# Patient Record
(demographics unavailable — no encounter records)

---

## 2018-05-21 NOTE — RADIOLOGY REPORT (SQ)
EXAM DESCRIPTION:  MRI ABDOMEN COMBO



COMPLETED DATE/TIME:  5/21/2018 11:10 am



REASON FOR STUDY:  CIRRHOSIS, NON ALCOHOL/ABNORMAL TUMOR K74.69  OTHER CIRRHOSIS OF LIVER R97.8  OTHE
R ABNORMAL TUMOR MARKERS



COMPARISON:  MRI abdomen 6/16/2017

Abdominal ultrasound 5/31/2017

CT abdomen pelvis 5/30/2017



TECHNIQUE:  Multiplanar multisequence imaging performed without and with contrast including sagittal,
 axial and coronal T2, axial T1, axial gradient fat sat T1, axial, sagittal and coronal fat sat T1 po
st contrast.



CONTRAST TYPE AND DOSE:  20 mL Prohance.



RENAL FUNCTION:  GFR > 60.



LIMITATIONS:  None.



FINDINGS:  LIVER: Normal size. No masses.  No dilated ducts. CBD normal.  Normal contrast enhancement
 of the main portal vein, right and left portal vein, hepatic veins.

SPLEEN: 14 cm in length.  No focal lesions

PANCREAS: No masses. No adjacent inflammation or peripancreatic fluid collections. Pancreatic duct no
t dilated.

GALLBLADDER: No masses. No stones. No gallbladder wall thickening or pericholecystic fluid.

ADRENAL GLANDS: No significant masses or asymmetry.

RIGHT KIDNEY AND URETER: No masses. No hydronephrosis.

LEFT KIDNEY AND URETER: No masses.  There is left-sided caliectasis and mildly enlarged left renal pe
lvis, stable compared to previous exams.  Question congenital UPJ obstruction.

AORTA AND VESSELS: No aneurysm. No dissection. Renal arteries, SMA, celiac without stenosis.

RETROPERITONEUM: No retroperitoneal adenopathy, hemorrhage or masses.

BOWEL: Not well seen

ABDOMINAL WALL AND PERITONEUM: No adenopathy or abdominal aorta aneurysm

BONES: No acute or significant findings.

OTHER: There is a umbilical hernia containing nonobstructed small bowel loops.



IMPRESSION:  There is splenomegaly without ascites

No focal liver lesions



TECHNICAL DOCUMENTATION:  JOB ID: [id number]

 [employer]- All Rights Reserved



Reading location - IP/workstation name: [email or web address]

## 2018-11-12 NOTE — RADIOLOGY REPORT (SQ)
EXAM DESCRIPTION:  U/S RETROPERITON (RENAL/AORTA)



COMPLETED DATE/TIME:  11/12/2018 10:02 am



REASON FOR STUDY:  UNSPEC HYDRONEPHROSIS (N13.30) N13.30  UNSPECIFIED HYDRONEPHROSIS



COMPARISON:  MRI abdomen dated 5/21/2018 and 6/16/2017



TECHNIQUE:  Dynamic and static grayscale images acquired of the kidneys and bladder and recorded on P
ACS. Additional selected color Doppler and spectral images recorded.



LIMITATIONS:  None.



FINDINGS:  RIGHT KIDNEY:  The right kidney measures 9.4 cm in length.  Normal echogenicity. No solid 
or suspicious masses. No hydronephrosis. No calcifications.

LEFT KIDNEY:  The left kidney measures 13.0 cm in length.  Normal echogenicity.  Mild prominence of t
he renal.  Mid.  No evidence for significant hydronephrosis.  No calcifications

BLADDER: No masses.  Bilateral ureteral jets are not visualized.

OTHER FINDINGS:  The spleen measures 15.6 cm in length, prominent in size.



IMPRESSION:  1.  The right kidney is slightly smaller in size than the left.  No significant hydronep
hrosis involving either kidney.

2.  Incidental finding splenomegaly.



TECHNICAL DOCUMENTATION:  JOB ID:  [id number]

 2011 [employer]- All Rights Reserved



Reading location - IP/workstation name: [email or web address]

## 2018-12-04 NOTE — ER DOCUMENT REPORT
ED General





- General


Chief Complaint: Dizziness


Stated Complaint: BLOOD PRESSURE PROBLEM


Time Seen by Provider: 12/04/18 16:36


Notes: 





This is a 66-year-old male who had a near syncopal episode while at [facility].  

EMS was called.  Patient never lost consciousness.  Denied any chest pain or 

shortness of breath.  States he just felt a little bit dizzy.  Patient has 

multiple medical problems.  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 

remission.  Followed by gastroenterology, internal medicine as well as 

hematology.  Does not have any malignancy.  Denies any chest pain or shortness 

of breath.  Patient does state that he takes stool softeners and he had a 

couple of fiber bars today and seems to have some loose stool and may have lost 

his bowels.


TRAVEL OUTSIDE OF THE [place] IN LAST 30 DAYS: No





- HPI


Onset: Just prior to arrival


Onset/Duration: Gradual





- Related Data


Allergies/Adverse Reactions: 


 





No Known Allergies Allergy (Verified 12/04/18 15:25)


 











Past Medical History





- General


Information source: Patient





- Social History


Smoking Status: Never Smoker


Chew tobacco use (# tins/day): No


Frequency of alcohol use: None


Drug Abuse: None


Lives with: Alone


Family History: Malignancy - Father with colon cancer in his 50s.


Patient has suicidal ideation: No


Patient has homicidal ideation: No





- Past Medical History


Cardiac Medical History: Reports: Hx Hypertension


   Denies: Hx Coronary Artery Disease, Hx Heart Attack


Pulmonary Medical History: 


   Denies: Hx Asthma, Hx Bronchitis, Hx COPD, Hx Pneumonia


Neurological Medical History: Denies: Hx Cerebrovascular Accident, Hx Seizures


Renal/ Medical History: Denies: Hx Peritoneal Dialysis


GI Medical History: Reports: Hx Gastroesophageal Reflux Disease


Musculoskeletal Medical History: Denies Hx Arthritis


Past Surgical History: Reports: Hx Orthopedic Surgery - L arm, Other - Arm 

orthopedic surgery in the remote past.





- Immunizations


Hx Diphtheria, Pertussis, Tetanus Vaccination: Yes


Hx Pneumococcal Vaccination: 05/01/17





Review of Systems





- Review of Systems


Notes: 





Constitutional: denies: Chills, Diaphoresis, Fever, Malaise, Weakness





EENT: denies: Eye discharge, Blurred vision, Tearing, Double vision, Nose 

congestion, Nose discharge, Throat swelling, Mouth pain





Cardiovascular: denies: Palpitations, Heart racing, Orthopnea, Dyspnea, Chest 

pain





Respiratory: denies: Cough, Hurts to breathe, Wheezing, Shortness of breath





Gastrointestinal: denies: Abdominal pain, Diarrhea, Nausea, Vomiting, Black 

stools, bright red blood in stool





Genitourinary: denies: Burning, Dysuria, Discharge, Frequency, Flank pain, 

Hematuria





Musculoskeletal:  denies: Joint pain, Joint swelling, Muscle pain, Muscle 

stiffness, back pain





Hematologic/Lymphatic:  denies: Anemia, Easy bleeding, Easy bruising, Blood 

clots





Neurological/Psychological: denies: Confusion, Dementia, Depression, Loss of 

consciousness





Skin: No lesions, no masses, no skin breakdown, no abscesses





Physical Exam





- Vital signs


Vitals: 


 











Temp


 


 98.5 F 


 


 12/04/18 15:15











Interpretation: Hypotensive





- General


General appearance: Appears well, Alert





- HEENT


Head: Normocephalic, Atraumatic


Eyes: Normal


Pupils: PERRL





- Respiratory


Respiratory status: No respiratory distress


Chest status: Nontender


Breath sounds: Normal


Chest palpation: Normal





- Cardiovascular


Rhythm: Regular


Heart sounds: Normal auscultation


Murmur: No





- Abdominal


Inspection: Normal


Distension: No distension


Bowel sounds: Normal


Tenderness: Nontender


Organomegaly: No organomegaly





- Rectal


Stool: Heme negative, Black





- Back


Back: Normal, Nontender





- Extremities


General upper extremity: Normal inspection, Nontender, Normal color, Normal ROM

, Normal temperature


General lower extremity: Normal inspection, Nontender, Normal color, Normal ROM

, Normal temperature, Normal weight bearing.  No: Fatemeh's sign





- Neurological


Neuro grossly intact: Yes


Cognition: Normal


Orientation: AAOx4


Doniphan Coma Scale Eye Opening: Spontaneous


Vicente Coma Scale Verbal: Oriented


Vicente Coma Scale Motor: Obeys Commands


Doniphan Coma Scale Total: 15


Speech: Normal


Motor strength normal: LUE, RUE, LLE, RLE


Sensory: Normal





- Psychological


Associated symptoms: Normal affect, Normal mood





- Skin


Skin Temperature: Warm


Skin Moisture: Dry


Skin Color: Normal





Course





- Re-evaluation


Re-evalutation: 





12/04/18 21:22





Laboratory











  12/04/18 12/04/18 12/04/18





  15:35 15:35 15:35


 


WBC  4.2  


 


RBC  3.53 L  


 


Hgb  11.2 L  


 


Hct  31.5 L  


 


MCV  89  


 


MCH  31.7  


 


MCHC  35.5  


 


RDW  13.9  


 


Plt Count  67 L  


 


Seg Neutrophils %  51.6  


 


Lymphocytes %  31.9  


 


Monocytes %  12.7  


 


Eosinophils %  2.8  


 


Basophils %  1.0  


 


Absolute Neutrophils  2.2  


 


Absolute Lymphocytes  1.3  


 


Absolute Monocytes  0.5  


 


Absolute Eosinophils  0.1  


 


Absolute Basophils  0.0  


 


Sodium   141.0 


 


Potassium   4.3 


 


Chloride   107 


 


Carbon Dioxide   24 


 


Anion Gap   10 


 


BUN   17 


 


Creatinine   0.77 


 


Est GFR ( Amer)   > 60 


 


Est GFR (Non-Af Amer)   > 60 


 


Glucose   147 H 


 


Calcium   8.5 


 


Total Bilirubin   0.6 


 


Direct Bilirubin   0.2 


 


Neonat Total Bilirubin   Not Reportable 


 


Neonat Direct Bilirubin   Not Reportable 


 


Neonat Indirect Bili   Not Reportable 


 


AST   29 


 


ALT   21 


 


Alkaline Phosphatase   48 


 


Creatine Kinase   51 L 


 


CK-MB (CK-2)    0.61


 


Troponin I    < 0.012


 


Total Protein   5.3 L 


 


Albumin   2.7 L 


 


Stool Occult Blood   














  12/04/18





  19:44


 


WBC 


 


RBC 


 


Hgb 


 


Hct 


 


MCV 


 


MCH 


 


MCHC 


 


RDW 


 


Plt Count 


 


Seg Neutrophils % 


 


Lymphocytes % 


 


Monocytes % 


 


Eosinophils % 


 


Basophils % 


 


Absolute Neutrophils 


 


Absolute Lymphocytes 


 


Absolute Monocytes 


 


Absolute Eosinophils 


 


Absolute Basophils 


 


Sodium 


 


Potassium 


 


Chloride 


 


Carbon Dioxide 


 


Anion Gap 


 


BUN 


 


Creatinine 


 


Est GFR ( Amer) 


 


Est GFR (Non-Af Amer) 


 


Glucose 


 


Calcium 


 


Total Bilirubin 


 


Direct Bilirubin 


 


Neonat Total Bilirubin 


 


Neonat Direct Bilirubin 


 


Neonat Indirect Bili 


 


AST 


 


ALT 


 


Alkaline Phosphatase 


 


Creatine Kinase 


 


CK-MB (CK-2) 


 


Troponin I 


 


Total Protein 


 


Albumin 


 


Stool Occult Blood  NEGATIVE














 





Chest X-Ray  12/04/18 16:44


IMPRESSION:  NO ACUTE RADIOGRAPHIC FINDING IN THE CHEST.


 








Patient states he feels much better after getting fluids.  Patient did have 

some transient hypotension however has multiple blood pressures now that are 

acceptable.  He is not tachycardic.  Does not feel bad.  Wants to go home.  

Guaiac negative.  Has multiple specialist to follow him.  At this time I feel 

comfortable discharging.  I did offer the patient an admission based on his 

symptoms he would more than likely would warrant a observation admission 

however patient adamantly denies wanting to stay in the hospital and is more 

concerned about his crockpot that is plugged him and he is hungry and wants to 

go home and eat his dinner that has been cooking all day.  DC at this time.





- Vital Signs


Vital signs: 


 











Temp Pulse Resp BP Pulse Ox


 


 98.3 F   67   20   96/69 L  97 


 


 12/04/18 21:36  12/04/18 15:26  12/04/18 21:31  12/04/18 21:32  12/04/18 21:30














- Laboratory


Result Diagrams: 


 12/04/18 15:35





 12/04/18 15:35


Laboratory results interpreted by me: 


 











  12/04/18 12/04/18





  15:35 15:35


 


RBC  3.53 L 


 


Hgb  11.2 L 


 


Hct  31.5 L 


 


Plt Count  67 L 


 


Glucose   147 H


 


Creatine Kinase   51 L


 


Total Protein   5.3 L


 


Albumin   2.7 L














- EKG Interpretation by Me


EKG shows normal: Sinus rhythm, Axis, Intervals, QRS Complexes, ST-T Waves





Discharge





- Discharge


Clinical Impression: 


 Transient hypotension





Condition: Good


Disposition: HOME, SELF-CARE


Instructions:  Dizziness ([facility]), Orthostatic Hypotension ([facility])


Additional Instructions: 


Return immediately for any concerns.  Please drink plenty of fluid.  Hold your 

panel all tonight and tomorrow morning.  If you are feeling better you may 

resume your regular medications tomorrow night.  Follow-up with your regular 

doctor soon as possible.


Referrals: 


NAYELY KULKARNI MD [Primary Care Provider] - Follow up as needed

## 2018-12-04 NOTE — RADIOLOGY REPORT (SQ)
EXAM DESCRIPTION:  CHEST SINGLE VIEW



COMPLETED DATE/TIME:  12/4/2018 4:58 pm



REASON FOR STUDY:  sob



COMPARISON:  None.



EXAM PARAMETERS:  NUMBER OF VIEWS: One view.

TECHNIQUE: Single frontal radiographic view of the chest acquired.

RADIATION DOSE: NA

LIMITATIONS: None.



FINDINGS:  LUNGS AND PLEURA: No opacities, masses or pneumothorax. No pleural effusion.

MEDIASTINUM AND HILAR STRUCTURES: No masses.  Contour normal.

HEART AND VASCULAR STRUCTURES: Heart normal in size.  Normal vasculature.

BONES: There is rib deformity in the left upper hemithorax laterally.

HARDWARE: None in the chest.

OTHER: No other significant finding.



IMPRESSION:  NO ACUTE RADIOGRAPHIC FINDING IN THE CHEST.



TECHNICAL DOCUMENTATION:  JOB ID:  [id number]

 2011 [employer]- All Rights Reserved



Reading location - IP/workstation name: [email or web address]

## 2018-12-12 NOTE — ER DOCUMENT REPORT
ED General





- General


Chief Complaint: GI Bleeding


Stated Complaint: RECTAL BLEEDING


Time Seen by Provider: 12/12/18 18:31


Notes: 





66-year-old male in no acute distress with history of cirrhosis presents to the 

emergency department for diarrhea, pain in his kidneys and "feeling faint".  He 

was seen here last Tuesday for the symptoms of lightheadedness and was 

Hemoccult negative then.  This past Saturday night he had a nosebleed and a 

bowel movement that was concerning for blood.  Since then he has had severe 

dyspnea on exertion, lightheadedness, and "cannot walk 50 feet without stopping

".  He endorses dizziness, diarrhea that is chronic in nature with blood in it, 

and flank pain.  He denies nausea, vomiting, or any other symptoms.  He was at 

his doctor's office last Friday and was told that his hemoglobin was 10.


TRAVEL OUTSIDE OF THE [place] IN LAST 30 DAYS: No





- Related Data


Allergies/Adverse Reactions: 


 





No Known Allergies Allergy (Verified 12/04/18 15:25)


 











Past Medical History





- General


Information source: Patient





- Social History


Smoking Status: Unknown if Ever Smoked


Frequency of alcohol use: quit 2.5 years ago


Drug Abuse: None


Lives with: Alone


Family History: Reviewed & Not Pertinent, Malignancy - Father with colon cancer 

in his 50s.


Patient has suicidal ideation: No


Patient has homicidal ideation: No





- Past Medical History


Cardiac Medical History: Reports: Hx Hypertension


   Denies: Hx Coronary Artery Disease, Hx Heart Attack


Pulmonary Medical History: 


   Denies: Hx Asthma, Hx Bronchitis, Hx COPD, Hx Pneumonia


Neurological Medical History: Denies: Hx Cerebrovascular Accident, Hx Seizures


Renal/ Medical History: Denies: Hx Peritoneal Dialysis


GI Medical History: Reports: Hx Gastroesophageal Reflux Disease


Musculoskeletal Medical History: Denies Hx Arthritis


Past Surgical History: Reports: Hx Orthopedic Surgery - L arm, Other - Arm 

orthopedic surgery in the remote past.





- Immunizations


Hx Diphtheria, Pertussis, Tetanus Vaccination: Yes


Hx Pneumococcal Vaccination: 05/01/17





Review of Systems





- Review of Systems


Constitutional: See HPI


EENT: See HPI


Cardiovascular: See HPI


Respiratory: See HPI


Gastrointestinal: See HPI


Genitourinary: See HPI


Male Genitourinary: No symptoms reported


Musculoskeletal: No symptoms reported


Skin: No symptoms reported


Hematologic/Lymphatic: No symptoms reported


Neurological/Psychological: No symptoms reported





Physical Exam





- Vital signs


Vitals: 


 











BP


 


 111/94 H


 


 12/12/18 18:24














- Notes


Notes: 





Reviewed vital signs and nursing note as charted by RN. 


CONSTITUTIONAL: well-nourished, acting appropriately for age   


HEAD: Normocephalic, atraumatic, no swelling


EYES: PERRL, Conjunctivae clear, no drainage, EOMI, no scleral icterus


ENT: External ears without lesions, External auditory canal is patent, airway 

patent, mucous membranes pink and moist


NECK: Supple, no cervical lymphadenopathy, no masses


CARD: Regular rate and rhythm, no murmurs, no rubs, no gallops, capillary 

refill < 2 seconds, symmetric pulses


RESP:   The lungs are clear to auscultation bilaterally, no wheezing, no rales, 

no rhonchi.  Respiratory rate and effort are normal, normal chest excursion.  

No respiratory distress, no retractions, no stridor, no nasal flaring, no 

accessory muscle use. 


ABD/GI: Normal bowel sounds, non-distended, soft, non-tender, no rebound, no 

guarding, no organomegaly, rectal exam performed, loose stool and gross blood 

visualized, occult blood sample taken


EXT: Normal ROM in all joints, non-tender to palpation, no effusions, no edema 


SKIN: Normal color for age and race, warm, dry, good turgor, no acute lesions 

noted


NEURO: No facial asymmetry, moves all extremities equally, motor and sensory 

function intact





Course





- Re-evaluation


Re-evalutation: 





12/12/18 19:54


Evaluated patient.  Abdomen was soft, no tenderness to palpation.  Reducible 

umbilical hernia noted.  Patient's hemoglobin 5.7.  Small amount of gross blood 

noted in rectum upon performing rectal exam.  Hemoccult sent. 





2 units PRBCs ordered.  Reassessed patient at the bedside after nurse reported 

that map was 55.  Recycled blood pressure and blood pressure was normal.  Gave 

fluid bolus while waiting for blood products.  Blood pressures remained stable





I called Dr. Bullard for consultation for concern for lower GI bleed.


12/12/18 21:20


Dr. Bullard saw the patient performed a gastric lavage to rule out an upper GI 

bleed.  He deferred to the hospitalist for medical management.  I called Dr. Weiland the hospitalist and he accepted the patient. 


12/13/18 05:41








- Vital Signs


Vital signs: 


 











Temp Pulse Resp BP Pulse Ox


 


 99.0 F   83   18   114/56 L  94 


 


 12/13/18 03:48  12/13/18 03:48  12/13/18 03:48  12/13/18 03:48  12/13/18 03:48














- Laboratory


Result Diagrams: 


 12/12/18 18:51





 12/12/18 18:51


Laboratory results interpreted by me: 


 











  12/12/18 12/12/18 12/12/18





  18:51 18:51 18:51


 


WBC  17.1 H  


 


RBC  1.74 L  


 


Hgb  5.7 L  


 


Hct  16.8 L  


 


RDW  16.2 H  


 


Absolute Neutrophils  13.0 H  


 


PT   17.5 H 


 


Chloride    113 H


 


Carbon Dioxide    21 L


 


BUN    29 H


 


Glucose    112 H


 


Calcium    8.0 L


 


Total Protein    5.2 L


 


Albumin    2.7 L


 


Crossmatch   














  12/12/18





  19:38


 


WBC 


 


RBC 


 


Hgb 


 


Hct 


 


RDW 


 


Absolute Neutrophils 


 


PT 


 


Chloride 


 


Carbon Dioxide 


 


BUN 


 


Glucose 


 


Calcium 


 


Total Protein 


 


Albumin 


 


Crossmatch  See Detail














Discharge





- Discharge


Clinical Impression: 


 Lower GI bleed





Condition: Stable


Disposition: ADMITTED AS INPATIENT


Admitting Provider: Hospitalist


Unit Admitted: Medical Floor

## 2018-12-12 NOTE — RADIOLOGY REPORT (SQ)
EXAM DESCRIPTION:  CHEST SINGLE VIEW



COMPLETED DATE/TIME:  12/12/2018 7:36 pm



REASON FOR STUDY:  dyspnea



COMPARISON:  12/4/2018



EXAM PARAMETERS:  NUMBER OF VIEWS: One view.

TECHNIQUE: Single frontal radiographic view of the chest acquired.

RADIATION DOSE: NA

LIMITATIONS: None.



FINDINGS:  LUNGS AND PLEURA: No consolidation or pleural effusions.  Slight pleural thickening in the
 periphery of the left upper lobe is unchanged and most likely associated with the rib deformity.

MEDIASTINUM AND HILAR STRUCTURES: No masses.  Contour normal.

HEART AND VASCULAR STRUCTURES: Heart normal in size.  Normal vasculature.

BONES: No acute findings.

HARDWARE: None in the chest.

OTHER: No other significant finding.



IMPRESSION:  No interval change in the chest.  No acute findings.  Pleural thickening in the left upp
er lateral chest is most likely related to the rib deformity.



TECHNICAL DOCUMENTATION:  JOB ID:  [id number]

 2011 [employer]- All Rights Reserved



Reading location - IP/workstation name: [email or web address]

## 2018-12-12 NOTE — EKG REPORT
SEVERITY:- BORDERLINE ECG -

SINUS RHYTHM

BORDERLINE PROLONGED QT INTERVAL

:

Confirmed by: Luciana Gonzáles MD 12-Dec-2018 20:50:27

## 2018-12-12 NOTE — PDOC CONSULTATION
History of Present Illness


Admission Date/PCP: 


  





  NAYELY KULKARNI MD





Patient complains of: rectal bleeding


History of Present Illness: 


KEKE CHAMBERS III is a 66 year old male seen at the request of the 

emergency room physician.  There is a patient with a history of liver disease 

and cirrhosis.  The patient has a 3-4 day history of feeling faint.  The 

patient was evaluated in the ER several days ago, where no significant 

abnormality was identified.  The patient reports that today he began having 

dark black bowel movements with clots present.  Patient began feeling worse and 

presented to the emergency department.  He denies nausea, vomiting, hematemesis

, chest pain, shortness of breath, fevers, chills.  He does report malaise, 

fatigue, melena, hematochezia, orthostasis.  His hemoglobin was found to be 

5.7.  The patient is currently receiving transfusion.  The patient's last 

colonoscopy was approximately 18 months ago and was normal (per his report).  

He does have a family history of colon cancer.








Past Medical History


Cardiac Medical History: Reports: Hypertension


   Denies: Coronary Artery Disease, Myocardial Infarction


Pulmonary Medical History: 


   Denies: Asthma, Bronchitis, Chronic Obstructive Pulmonary Disease (COPD), 

Pneumonia


Neurological Medical History: 


   Denies: Seizures


GI Medical History: Reports: Cirrhosis, Gastroesophageal Reflux Disease


Musculoskeltal Medical History: 


   Denies: Arthritis


Hematology: 


   Denies: Anemia





Past Surgical History


Past Surgical History: Reports: Orthopedic Surgery - L arm, Other - Arm 

orthopedic surgery in the remote past, colonoscopy 07/2017





Social History


Smoking Status: Unknown if Ever Smoked


Frequency of Alcohol Use: Heavy - but quit, was drinking 7-10 beers/day


Hx Recreational Drug Use: Yes - no IVDA, afraid of needles


Drugs: Marijuana


Hx Prescription Drug Abuse: No





Family History


Family History: Malignancy - Father with colon cancer in his 50s.


Parental Family History Reviewed: Yes


Children Family History Reviewed: Yes


Sibling(s) Family History Reviewed.: Yes





Medication/Allergy


Home Medications: 








Multivitamin [Multivitamins] 1 tab PO DAILY 05/23/17 


Omega-3/Dha/Epa/Fish Oil [Fish Oil 1,000 mg Softgel] 1 cap PO DAILY 05/23/17 


Omeprazole 1 cap PO DAILY 05/23/17 


Docusate Sodium [Colace 100 mg Capsule] 100 mg PO BID #60 capsule 06/02/17 


Ferrous Sulfate [Feosol 325 mg Tablet] 325 mg PO MEALS #90 tablet 06/02/17 


Propranolol HCl [Inderal 10 mg Tablet] 10 mg PO Q12 #60 tablet 06/02/17 








Allergies/Adverse Reactions: 


 





No Known Allergies Allergy (Verified 12/04/18 15:25)


 











Review of Systems


Constitutional: PRESENT: fatigue, weakness.  ABSENT: fever(s), headache(s)


Eyes: ABSENT: visual disturbances


Ears: ABSENT: hearing changes


Nose, Mouth, and Throat: ABSENT: sore throat


Cardiovascular: ABSENT: chest pain


Respiratory: ABSENT: cough, dyspnea


Gastrointestinal: PRESENT: hematochezia, melena.  ABSENT: abdominal pain, 

hematemesis


Genitourinary: ABSENT: difficulty urinating


Musculoskeletal: ABSENT: back pain


Integumentary: ABSENT: diaphoresis


Neurological: PRESENT: dizziness.  ABSENT: confusion, convulsions


Psychiatric: ABSENT: anxiety


Endocrine: ABSENT: cold intolerance, heat intolerance


Hematologic/Lymphatic: ABSENT: easy bruising





Physical Exam


Vital Signs: 


 











Temp Pulse Resp BP Pulse Ox


 


 98.4 F   99   18   119/43 L  96 


 


 12/12/18 21:06  12/12/18 21:06  12/12/18 21:06  12/12/18 21:06  12/12/18 21:06








 Intake & Output











 12/11/18 12/12/18 12/13/18





 06:59 06:59 06:59


 


Intake Total   0


 


Balance   0


 


Weight   81.647 kg











General appearance: PRESENT: no acute distress


Head exam: PRESENT: atraumatic, normocephalic


Eye exam: PRESENT: EOMI, PERRLA


Mouth exam: PRESENT: neck supple


Neck exam: ABSENT: meningismus, tenderness, thyromegaly, tracheal deviation


Respiratory exam: PRESENT: clear to auscultation lauren.  ABSENT: chest wall 

tenderness


Cardiovascular exam: PRESENT: RRR


Pulses: PRESENT: normal radial pulses


Vascular exam: PRESENT: pallor


GI/Abdominal exam: PRESENT: soft.  ABSENT: distended, guarding, rigid, 

tenderness


Rectal exam: PRESENT: black stool, normal rectal tone.  ABSENT: hemorrhoids


Extremities exam: ABSENT: clubbing


Neurological exam: PRESENT: alert, awake, oriented to person, oriented to place

, oriented to time, oriented to situation, CN II-XII grossly intact


Psychiatric exam: ABSENT: agitated, anxious, depressed


Skin exam: ABSENT: cyanosis, erythema, jaundice





Results


Laboratory Results: 


 





 12/12/18 18:51 





 12/12/18 18:51 





 











  12/12/18 12/12/18 12/12/18





  18:51 18:51 19:38


 


WBC  17.1 H  


 


RBC  1.74 L  


 


Hgb  5.7 L  


 


Hct  16.8 L  


 


MCV  97  D  


 


MCH  32.4  


 


MCHC  33.6  


 


RDW  16.2 H  


 


Plt Count  161  


 


Seg Neutrophils %  76.1  


 


Lymphocytes %  18.4  


 


Monocytes %  4.8  


 


Eosinophils %  0.2  


 


Basophils %  0.5  


 


Absolute Neutrophils  13.0 H  


 


Absolute Lymphocytes  3.1  


 


Absolute Monocytes  0.8  


 


Absolute Eosinophils  0.0  


 


Absolute Basophils  0.1  


 


Sodium   140.4 


 


Potassium   4.7 


 


Chloride   113 H 


 


Carbon Dioxide   21 L 


 


Anion Gap   6 


 


BUN   29 H 


 


Creatinine   0.87 


 


Est GFR ( Amer)   > 60 


 


Est GFR (Non-Af Amer)   > 60 


 


Glucose   112 H 


 


Calcium   8.0 L 


 


Total Bilirubin   0.6 


 


AST   44 


 


ALT   30 


 


Alkaline Phosphatase   39 


 


Total Protein   5.2 L 


 


Albumin   2.7 L 


 


Blood Type    B POSITIVE


 


Antibody Screen    NEGATIVE








 











  12/12/18





  18:51


 


Troponin I  < 0.012











Impressions: 


 





Chest X-Ray  12/12/18 19:19


IMPRESSION:  No interval change in the chest.  No acute findings.  Pleural 

thickening in the left upper lateral chest is most likely related to the rib 

deformity.


 














Assessment & Plan





- Diagnosis


(1) Lower GI bleeding


Is this a current diagnosis for this admission?: Yes   





(2) Cirrhosis of liver with ascites


Qualifiers: 


   Hepatic cirrhosis type: unspecified hepatic cirrhosis   Qualified Code(s): 

K74.60 - Unspecified cirrhosis of liver; R18.8 - Other ascites; R18.8 - Other 

ascites   


Is this a current diagnosis for this admission?: Yes   





- Plan Summary


Plan Summary: 





This is a cirrhotic patient with lower GI bleeding.  I personally inserted an 

NG tube and performed gastric lavage.  No blood or coffee grounds could be 

identified upon lavage.  The bleeding source is presumed to be distal to the 

duodenum.  Patient is having dark, bloody bowel movements.  His last bowel 

movement was earlier today.  Currently, the patient's hemoglobin is 5.7.  He is 

undergoing transfusion at this time.  I recommend stabilization, resuscitation, 

and transfusion.  Plan for colonoscopy tomorrow if the patient will tolerate a 

bowel prep.  If continued bleeding is suspected, the patient will need a 

localization study (CT angiogram) versus urgent colectomy.  I will follow this 

patient closely with you.

## 2018-12-13 NOTE — RADIOLOGY REPORT (SQ)
EXAM DESCRIPTION:  NM GI BLEED SCAN



COMPLETED DATE/TIME:  12/13/2018 5:53 pm



REASON FOR STUDY:  gi bleed



COMPARISON:  MRI abdomen 5/21/2018

CT abdomen pelvis 5/30/2017



RADIONUCLIDE AND DOSE:  32.2 millicuries Technetium-labeled red blood cells.

The route of agent administration: Intravenous.



TECHNIQUE:  Serial arterial-phase images acquired for 80 seconds immediately following injection of r
adionuclide.  Additional 60 images acquired at 60 seconds per image.



LIMITATIONS:  None.



FINDINGS:  Flow images without focal areas of abnormal radionuclide location.  Serial images show no 
abnormal accumulation of radionuclide.



IMPRESSION:  NORMAL RADIONUCLIDE GASTROINTESTINAL BLEEDING STUDY.



TECHNICAL DOCUMENTATION:  JOB ID:  [id number]

 2011 [employer]- All Rights Reserved



Reading location - IP/workstation name: [email or web address]

## 2018-12-13 NOTE — PDOC TRANSFER SUMMARY
General


Admission Date/PCP: 


  12/12/18 22:09





  NAYELY KULKARNI MD





Resuscitation Status: Full Code





- Transfer Diagnosis


(1) Anemia associated with acute blood loss


Is this a current diagnosis for this admission?: Yes   


Diagnosis Summary: 


Patient's anemia is being addressed by transfusion of 2 units of packed red 

blood cells and serial CBCs performed every 6 hours to observe for further 

blood loss or other hematologic abnormalities.








32,018 patient received total of 4 units of PRBC.  Latest hemoglobin is 9.4.  

Requested 2 more units of PRBC.  We will check the CBC every 6 hours.








(2) Lower GI bleed


Is this a current diagnosis for this admission?: Yes   


Diagnosis Summary: 


12/13/2018 so far patient received 2 units of packed red blood cells, he is 

receiving the third unit of PRBC now hemoglobin is six-point.  Patient 

complains of worsening lower GI bleed with GoLYTELY is refusing to drink 

GoLYTELY.  She was requested every 6 hours.  Patient is only on mechanical 

prophylaxis.  I am going to put him on Protonix IV drip.








12/13/2018 patient was admitted with lower GI bleed he has history of cirrhosis 

of the liver.  He was seen by the surgical team last night that was softly he 

agreed to do the colonoscopy this today and requested the hospitalist to admit 

the patient.  Patient has a rapid response this morning because of low blood 

pressure be started on IV fluids patient was transferred from telemetry to the 

ICU he received a total of 4 units of blood transfusion hemoglobin is 9.4 now 

latest blood pressure is 128/60.  He is getting IV fluids normal saline at 75 cc

/h.  He is on Protonix drip.  Bleeding scan was done which was negative.  The 

surgeon Dr. Ackerman called me and told me to transfer the patient is a high 

risk of continuous GI bleeding and dropping platelets.  In the ICU patient has 

at least 3-4 blood per rectum episodes.   bright colored blood per rectum.  

Patient vital signs are relatively stable.  








(3) Hepatic cirrhosis


Is this a current diagnosis for this admission?: Yes   


Diagnosis Summary: 


12/13/2018 patient given the history of liver failure secondary to heavy 

alcohol use.  He said he quit drinking 2-1/2 years ago.  Latest platelet count 

is 93,000.  INR is 1.37.








12/13/2018 patient has history of liver failure secondary to heavy alcohol use.

  His platelet count is 75,000.  INR is stable around 1.37.








(4) Leukocytosis


Is this a current diagnosis for this admission?: Yes   





- Transfer Medications


Home Medications: 


 





Multivitamin [Multivitamins] 1 tab PO DAILY 05/23/17 


Omeprazole 20 mg PO DAILY 05/23/17 


Fish Oil/Dha/Epa [Fish Oil 1,200 mg Fish Oil] 2,400 mg PO QPM 12/13/18 


Propranolol HCl [Inderal 10 mg Tablet] 20 mg PO QHS 12/13/18 


Propranolol HCl [Inderal 10 mg Tablet] 30 mg PO QAM 12/13/18 








Transfer Medications: 


 Current Medications





Acetaminophen (Tylenol 325 Mg Tablet)  650 mg PO Q4HP PRN


   PRN Reason: For headache, pain or fever


   Stop: 01/11/19 21:30


   Last Admin: 12/12/18 22:50 Dose:  650 mg


Acetaminophen (Tylenol 650 Mg Supp)  650 mg NE Q4HP PRN


   PRN Reason: For headache, pain or fever


   Stop: 01/11/19 21:30


Acetaminophen (Tylenol 325 Mg Tablet)  650 mg PO .BEFORE TRANSFUSION PRN


   PRN Reason: THIS MED IS NOT "PRN"


   Stop: 12/14/18 05:10


Al Hydrox/Mg Hydrox/Simethicone (Maalox Plus Susp 30 Udcup)  30 ml PO Q6HP PRN


   PRN Reason: HEARTBURN


   Stop: 01/11/19 21:21


Diphenhydramine HCl (Benadryl 25 Mg Capsule)  25 mg PO .BEFORE TRANSFUSION PRN


   PRN Reason: THIS MED IS NOT "PRN"


   Stop: 12/14/18 05:10


Diphenhydramine HCl (Benadryl Inj 50 Mg/1 Ml Vial)  12.5 mg IV .WHILE IN PACU 

PRN


   PRN Reason: ITCHING


   Stop: 12/13/18 21:38


Docusate Sodium (Colace 100 Mg Capsule)  100 mg PO BID MARCELINO


   Stop: 01/12/19 09:59


   Last Admin: 12/13/18 17:48 Dose:  Not Given


Fentanyl Citrate (Sublimaze Inj/Pf 100 Mcg/2 Ml Ampule)  25 mcg IV .WHILE IN 

PACU PRN


   PRN Reason: PAIN SCALE 2-3


   Stop: 12/13/18 21:38


Fentanyl Citrate (Sublimaze Inj/Pf 100 Mcg/2 Ml Ampule)  12.5 mcg IV .WHILE IN 

PACU PRN


   PRN Reason: PAIN SCALE OF 1


   Stop: 12/13/18 21:38


Fentanyl Citrate (Sublimaze Inj/Pf 100 Mcg/2 Ml Ampule)  50 mcg IV .WHILE IN 

PACU PRN


   PRN Reason: PAIN SCALE 4-5


   Stop: 12/13/18 21:38


Ferrous Sulfate (Feosol 325 Mg Tablet)  325 mg PO [id number] [facility]


   Stop: 01/12/19 08:59


   Last Admin: 12/13/18 10:22 Dose:  325 mg


Furosemide (Lasix Inj/Pf 20 Mg/2 Ml Sdv)  20 mg IV NOW PRN


   PRN Reason: SEE LABEL COMMENTS


Furosemide (Lasix 20 Mg Tablet)  20 mg PO .AFTER FIRST UNIT PRN


   PRN Reason: THIS MED IS NOT "PRN"


   Stop: 12/14/18 05:10


Heparin Sodium (Porcine) (Heparin Flush 10 Unit/Ml 5 Ml Disp.Syrg)  30 unit IV 

.AFTER EACH USE PRN


   PRN Reason: AFTER EACH INTERMITTENT USE


   Stop: 01/12/19 15:56


Heparin Sodium (Porcine) (Heparin Flush 10 Unit/Ml 5 Ml Disp.Syrg)  30 unit IV 

Q8 [facility]


   Stop: 01/12/19 21:59


Sodium Chloride (Nacl 0.9% 1000 Ml Iv Soln)  1,000 mls @ 75 mls/hr IV 

CONTINUOUS PRN


   PRN Reason: THIS MED IS NOT "PRN"


   Stop: 01/12/19 08:07


Pantoprazole Sodium 80 mg/ (Sodium Chloride)  100 mls @ 10 mls/hr IV CONTINUOUS 

PRN


   PRN Reason: THIS MED IS NOT "PRN"


   Stop: 12/16/18 09:59


   Last Admin: 12/13/18 12:19 Dose:  10 mls/hr, 10 mls/hr


Sodium Chloride (Nacl 0.9% 250 Ml Iv Soln)  250 mls @ 30 mls/hr IV .DURING 

TRANSFUSION PRN


   PRN Reason: THIS MED IS NOT "PRN"


   Stop: 12/14/18 15:55


Sodium Chloride (Nacl 0.9% 250 Ml Iv Soln)  250 mls @ 0 mls/hr IV CONTINUOUS PRN

; As Directed


   PRN Reason: AFTER EACH UNIT


   Stop: 12/14/18 15:55


Magnesium Hydroxide (Milk Of Magnesia 30 Ml Udcup)  30 ml PO HSP PRN


   PRN Reason: FOR CONSTIPATION


   Stop: 01/11/19 21:21


Meperidine HCl (Demerol Inj 25 Mg/1 Ml Syringe)  12.5 mg IV .WHILE IN PACU PRN


   PRN Reason: Shivering


   Stop: 12/13/18 21:38


Morphine Sulfate (Morphine 10 Mg/Ml Inj)  2 mg IV Q2HP PRN


   PRN Reason: FOR PAIN SCALE 1-2


   Stop: 12/19/18 21:30


Morphine Sulfate (Morphine 10 Mg/Ml Inj)  3 mg IV Q2HP PRN


   PRN Reason: FOR PAIN SCALE 3-4


   Stop: 12/19/18 21:30


Morphine Sulfate (Morphine 10 Mg/Ml Inj)  4 mg IV Q2HP PRN


   PRN Reason: PAIN SCALE OF 5


   Stop: 12/19/18 21:30


Ondansetron HCl (Zofran Odt 4 Mg Tablet)  4 mg PO Q4HP PRN


   PRN Reason: FOR NAUSEA/VOMITING


   Stop: 01/11/19 21:21


Ondansetron HCl (Zofran Inj/Pf 4 Mg/2 Ml Sdv)  4 mg IV Q4HP PRN


   PRN Reason: FOR NAUSEA/VOMITING


   Stop: 01/11/19 21:21


Ondansetron HCl (Zofran Inj/Pf 4 Mg/2 Ml Sdv)  4 mg IV .WHILE IN PACU PRN


   PRN Reason: NAUSEA AND VOMITING


   Stop: 12/13/18 21:38


Promethazine HCl (Phenergan Inj 25 Mg/1 Ml Vial)  12.5 mg IV .WHILE IN PACU PRN


   PRN Reason: NAUSEA AND VOMITING


   Stop: 12/13/18 21:38


Propranolol HCl (Inderal 10 Mg Tablet)  10 mg PO Q12 [facility]


   Stop: 01/11/19 21:59


   Last Admin: 12/13/18 10:17 Dose:  Not Given


Sodium Chloride (Saline Flush 2.5 Ml Monoject Prefil Syrin)  2.5 ml IV Q8 [facility]


   Stop: 01/11/19 21:59


   Last Admin: 12/13/18 14:09 Dose:  Not Given


Sodium Chloride (Nacl 0.9% Inj/Pf 10 Ml Sdv)  10 ml IV .AFTER EACH USE PRN


   PRN Reason: AFTER EACH INTERMITTENT USE


   Stop: 01/12/19 15:56











- Allergies


Allergies/Adverse Reactions: 


 





No Known Allergies Allergy (Verified 12/04/18 15:25)


 











- Diet/Activity


Discharge Diet: Other (Comments) - npo


Discharge Activity: Bedrest





Hospital Course


Hospital Course: 





12/13/201866 year old male who presented to the emergency room with a history 

that he has been having progressive weakness and dyspnea (especially on exertion

) for the last week or more.  He has associated symptoms of feeling faint and 

lightheaded, occasional pains in his mid back (perinephric) areas, one episode 

of epistaxis, 1 day of diarrhea stools, progressively worsening dizziness and 

most recently nausea with vomiting over the last 24 hours.  He admits that he 

passed several large blood clots per his rectum today prior to coming to the 

emergency room.  He denies prior similar episodes and has not identified any 

aggravating or ameliorating factors for his progressive weakness and dyspnea or 

for his passing blood through his rectum.  In the emergency room he was found 

to have a hemoglobin of 5.7 and was subsequently admitted hospital for further 

evaluation and treatment with a surgical consultation made by the emergency 

room physician to Dr. Bullard.





12/13/2018 patient was initially admitted to telemetry sounds to ICU for rapid 

response that was called because of low blood pressure he was started on IV 

fluids to go to 12 units when his blood transfusion continue to have blood 

Blood blood diet blood in the stool.  He has a bleeding scan was done which was 

negative I just called a call from the surgeons that colonoscopy did not find 

any active bleeding site but they found large clots of blood.  I spoke to Dr. Humphreys in vident he more than happy to accept the patient appreciate his 

concerns and help.. Patient agreed to go to that facility.





Physical Exam


Vital Signs: 


 











Temp Pulse Resp BP Pulse Ox


 


 98.6 F   85   20   128/78 H  100 


 


 12/13/18 18:00  12/13/18 17:50  12/13/18 18:00  12/13/18 17:50  12/13/18 18:00








 Intake & Output











 12/12/18 12/13/18 12/14/18





 06:59 06:59 06:59


 


Intake Total  750 600


 


Output Total   500


 


Balance  750 100


 


Weight  184.8 kg 87.2 kg











General appearance: PRESENT: no acute distress


Head exam: PRESENT: atraumatic


Eye exam: PRESENT: PERRLA


Mouth exam: PRESENT: moist


Neck exam: ABSENT: carotid bruit, JVD, lymphadenopathy, thyromegaly


Respiratory exam: PRESENT: clear to auscultation lauren.  ABSENT: rales, rhonchi, 

wheezes


GI/Abdominal exam: PRESENT: normal bowel sounds, soft.  ABSENT: distended, 

guarding, mass, organolmegaly, rebound, tenderness


Neurological exam: PRESENT: alert, awake, oriented to person, oriented to place

, oriented to time, oriented to situation, CN II-XII grossly intact.  ABSENT: 

motor sensory deficit


Psychiatric exam: PRESENT: appropriate affect, normal mood.  ABSENT: homicidal 

ideation, suicidal ideation





Results


Laboratory Results: 


 





 12/13/18 15:22 





 12/13/18 06:20 





 











  12/13/18 12/13/18 12/13/18





  06:20 06:20 06:20


 


WBC    11.3 H


 


RBC    2.00 L


 


Hgb    6.2 L


 


Hct    18.2 L


 


MCV    91  D


 


MCH    30.9


 


MCHC    34.0


 


RDW    16.0 H


 


Plt Count    93 L


 


Seg Neutrophils %    72.5


 


Lymphocytes %    16.5


 


Monocytes %    9.7


 


Eosinophils %    0.9


 


Basophils %    0.4


 


Absolute Neutrophils    8.2


 


Absolute Lymphocytes    1.9


 


Absolute Monocytes    1.1


 


Absolute Eosinophils    0.1


 


Absolute Basophils    0.0


 


Sodium  141.9  


 


Potassium  4.0  


 


Chloride  115 H  


 


Carbon Dioxide  20 L  


 


Anion Gap  7  


 


BUN  29 H  


 


Creatinine  0.92  


 


Est GFR ( Amer)  > 60  


 


Est GFR (Non-Af Amer)  > 60  


 


Glucose  175 H  


 


Calcium  7.0 L*  


 


Magnesium  1.9  


 


TSH   2.20 


 


Free T4   0.83 


 


Free T3 pg/mL   1.59 L 














  12/13/18





  15:22


 


WBC  14.1 H


 


RBC  3.06 L


 


Hgb  9.4 L D


 


Hct  27.1 L


 


MCV  88


 


MCH  30.5


 


MCHC  34.5


 


RDW  15.6 H


 


Plt Count  75 L


 


Seg Neutrophils %  68.8


 


Lymphocytes %  21.5


 


Monocytes %  8.1


 


Eosinophils %  0.8


 


Basophils %  0.8


 


Absolute Neutrophils  9.7 H


 


Absolute Lymphocytes  3.0


 


Absolute Monocytes  1.1


 


Absolute Eosinophils  0.1


 


Absolute Basophils  0.1


 


Sodium 


 


Potassium 


 


Chloride 


 


Carbon Dioxide 


 


Anion Gap 


 


BUN 


 


Creatinine 


 


Est GFR ( Amer) 


 


Est GFR (Non-Af Amer) 


 


Glucose 


 


Calcium 


 


Magnesium 


 


TSH 


 


Free T4 


 


Free T3 pg/mL 











Impressions: 


 





Chest X-Ray  12/12/18 19:19


IMPRESSION:  No interval change in the chest.  No acute findings.  Pleural 

thickening in the left upper lateral chest is most likely related to the rib 

deformity.


 








GI Bleed Scan Nuclear Medicine  12/13/18 00:00


IMPRESSION:  NORMAL RADIONUCLIDE GASTROINTESTINAL BLEEDING STUDY.

## 2018-12-13 NOTE — PDOC H&P
History of Present Illness


Admission Date/PCP: 


  





  NAYELY KULKARNI MD





Patient complains of: Progressive weakness


History of Present Illness: 


KEKE CHAMBERS III is a 66 year old male who presented to the emergency 

room with a history that he has been having progressive weakness and dyspnea (

especially on exertion) for the last week or more.  He has associated symptoms 

of feeling faint and lightheaded, occasional pains in his mid back (perinephric

) areas, one episode of epistaxis, 1 day of diarrhea stools, progressively 

worsening dizziness and most recently nausea with vomiting over the last 24 

hours.  He admits that he passed several large blood clots per his rectum today 

prior to coming to the emergency room.  He denies prior similar episodes and 

has not identified any aggravating or ameliorating factors for his progressive 

weakness and dyspnea or for his passing blood through his rectum.  In the 

emergency room he was found to have a hemoglobin of 5.7 and was subsequently 

admitted hospital for further evaluation and treatment with a surgical 

consultation made by the emergency room physician to Dr. Bullard.





Past Medical History


Cardiac Medical History: Reports: Hypertension


   Denies: Atrial Fibrillation, Coronary Artery Disease, Myocardial Infarction


Pulmonary Medical History: 


   Denies: Asthma, Bronchitis, Chronic Obstructive Pulmonary Disease (COPD), 

Pneumonia


EENT Medical History: Reports: None


Neurological Medical History: 


   Denies: Hemorrhagic CVA, Ischemic CVA, Seizures


Endocrine Medical History: 


   Denies: Diabetes Mellitus Type 1, Diabetes Mellitus Type 2


Renal/ Medical History: 


   Denies: Chronic Kidney Disease, Nephrolithiasis


Malignancy Medical History: Reports: None


GI Medical History: Reports: Cirrhosis, Gastroesophageal Reflux Disease


   Denies: Hepatitis


Musculoskeltal Medical History: 


   Denies: Arthritis, Gout


Skin Medical History: 


   Denies: Eczema, Psoriasis


Psychiatric Medical History: 


   Denies: Alcohol Dependency, Substance Abuse, Tobacco Dependency


Traumatic Medical History: Reports: None


Hematology: 


   Denies: Anemia, Bleeding Tendencies


Infectious Medical History: Reports: None





Past Surgical History


Past Surgical History: Reports: Orthopedic Surgery - L arm, Other - Arm 

orthopedic surgery in the remote past, colonoscopy 07/2017





Social History


Smoking Status: Unknown if Ever Smoked


Frequency of Alcohol Use: Heavy - but quit, was drinking 7-10 beers/day


Hx Recreational Drug Use: Yes - no IVDA, afraid of needles


Drugs: Marijuana


Hx Prescription Drug Abuse: No





- Advance Directive


Resuscitation Status: Full Code


Surrogate healthcare decision maker:: 


Spouse





Family History


Family History: Malignancy - Father with colon cancer in his 50s.


Parental Family History Reviewed: Yes


Children Family History Reviewed: No


Sibling(s) Family History Reviewed.: Yes





Medication/Allergy


Home Medications: 








Multivitamin [Multivitamins] 1 tab PO DAILY 05/23/17 


Omega-3/Dha/Epa/Fish Oil [Fish Oil 1,000 mg Softgel] 1 cap PO DAILY 05/23/17 


Omeprazole 1 cap PO DAILY 05/23/17 


Docusate Sodium [Colace 100 mg Capsule] 100 mg PO BID #60 capsule 06/02/17 


Ferrous Sulfate [Feosol 325 mg Tablet] 325 mg PO MEALS #90 tablet 06/02/17 


Propranolol HCl [Inderal 10 mg Tablet] 10 mg PO Q12 #60 tablet 06/02/17 








Allergies/Adverse Reactions: 


 





No Known Allergies Allergy (Verified 12/04/18 15:25)


 











Review of Systems


Constitutional: PRESENT: as per HPI, weakness.  ABSENT: chills, fever(s)


Eyes: ABSENT: visual disturbances, other - Ocular pain


Ears: ABSENT: hearing changes, other - Ear pain


Nose, Mouth, and Throat: ABSENT: mouth pain, sore throat


Cardiovascular: PRESENT: dyspnea on exertion, palpitations - Tachycardia.  

ABSENT: chest pain, edema, orthropnea


Respiratory: PRESENT: dyspnea.  ABSENT: cough, hemoptysis


Gastrointestinal: PRESENT: as per HPI, diarrhea, nausea, vomiting.  ABSENT: 

abdominal pain, constipation


Genitourinary: ABSENT: dysuria, hematuria


Musculoskeletal: ABSENT: deformity, joint swelling


Integumentary: ABSENT: pruritus, rash


Neurological: ABSENT: confusion, convulsions, memory loss, tremor(s)


Psychiatric: ABSENT: anxiety, depression


Endocrine: ABSENT: cold intolerance, heat intolerance


Hematologic/Lymphatic: ABSENT: easy bleeding, easy bruising





Physical Exam


Vital Signs: 


 











Temp Pulse Resp BP Pulse Ox


 


 98.4 F   99   18   119/43 L  96 


 


 12/12/18 21:06  12/12/18 21:06  12/12/18 21:06  12/12/18 21:06  12/12/18 21:06








 Intake & Output











 12/10/18 12/11/18 12/12/18





 23:59 23:59 23:59


 


Intake Total   0


 


Balance   0


 


Weight   81.647 kg











General appearance: PRESENT: no acute distress, cooperative


Head exam: PRESENT: atraumatic, normocephalic


Eye exam: PRESENT: conjunctiva pale, EOMI.  ABSENT: nystagmus, scleral icterus


Ear exam: PRESENT: normal external ear exam.  ABSENT: bleeding


Mouth exam: PRESENT: neck supple, other - Oral mucosa is moist and intact, 

dentition is in fair repair


Neck exam: ABSENT: JVD, thyromegaly, tracheal deviation


Respiratory exam: PRESENT: clear to auscultation lauren, symmetrical, unlabored


Cardiovascular exam: PRESENT: RRR.  ABSENT: clicks, gallop, rubs


Pulses: PRESENT: normal radial pulses, normal dorsalis pedis pul


GI/Abdominal exam: PRESENT: hernia - Umbilical hernia noted, easily reducible, 

normal bowel sounds, soft.  ABSENT: tenderness


Rectal exam: PRESENT: deferred


Extremities exam: ABSENT: joint swelling, pedal edema


Musculoskeletal exam: PRESENT: full ROM, normal inspection


Neurological exam: PRESENT: alert, oriented to person, oriented to place, 

oriented to time, oriented to situation, CN II-XII grossly intact.  ABSENT: 

motor sensory deficit


Psychiatric exam: PRESENT: appropriate affect, normal mood


Skin exam: PRESENT: dry, intact, warm.  ABSENT: jaundice, rash, urticaria





Results


Laboratory Results: 


 





 12/12/18 18:51 





 12/12/18 18:51 





 











  12/12/18 12/12/18 12/12/18





  18:51 18:51 19:38


 


WBC  17.1 H  


 


RBC  1.74 L  


 


Hgb  5.7 L  


 


Hct  16.8 L  


 


MCV  97  D  


 


MCH  32.4  


 


MCHC  33.6  


 


RDW  16.2 H  


 


Plt Count  161  


 


Seg Neutrophils %  76.1  


 


Lymphocytes %  18.4  


 


Monocytes %  4.8  


 


Eosinophils %  0.2  


 


Basophils %  0.5  


 


Absolute Neutrophils  13.0 H  


 


Absolute Lymphocytes  3.1  


 


Absolute Monocytes  0.8  


 


Absolute Eosinophils  0.0  


 


Absolute Basophils  0.1  


 


Sodium   140.4 


 


Potassium   4.7 


 


Chloride   113 H 


 


Carbon Dioxide   21 L 


 


Anion Gap   6 


 


BUN   29 H 


 


Creatinine   0.87 


 


Est GFR ( Amer)   > 60 


 


Est GFR (Non-Af Amer)   > 60 


 


Glucose   112 H 


 


Calcium   8.0 L 


 


Total Bilirubin   0.6 


 


AST   44 


 


ALT   30 


 


Alkaline Phosphatase   39 


 


Total Protein   5.2 L 


 


Albumin   2.7 L 


 


Stool Occult Blood   


 


Blood Type    B POSITIVE


 


Antibody Screen    NEGATIVE














  12/12/18





  19:45


 


WBC 


 


RBC 


 


Hgb 


 


Hct 


 


MCV 


 


MCH 


 


MCHC 


 


RDW 


 


Plt Count 


 


Seg Neutrophils % 


 


Lymphocytes % 


 


Monocytes % 


 


Eosinophils % 


 


Basophils % 


 


Absolute Neutrophils 


 


Absolute Lymphocytes 


 


Absolute Monocytes 


 


Absolute Eosinophils 


 


Absolute Basophils 


 


Sodium 


 


Potassium 


 


Chloride 


 


Carbon Dioxide 


 


Anion Gap 


 


BUN 


 


Creatinine 


 


Est GFR ( Amer) 


 


Est GFR (Non-Af Amer) 


 


Glucose 


 


Calcium 


 


Total Bilirubin 


 


AST 


 


ALT 


 


Alkaline Phosphatase 


 


Total Protein 


 


Albumin 


 


Stool Occult Blood  POSITIVE


 


Blood Type 


 


Antibody Screen 








 











  12/12/18





  18:51


 


Troponin I  < 0.012











Impressions: 


 





Chest X-Ray  12/12/18 19:19


IMPRESSION:  No interval change in the chest.  No acute findings.  Pleural 

thickening in the left upper lateral chest is most likely related to the rib 

deformity.


 














Assessment & Plan





- Diagnosis


(1) Lower GI bleeding


Is this a current diagnosis for this admission?: Yes   


Plan: 


Patient has been seen by Dr. Bullard for surgical evaluation in the ER and is 

presumed Dr. Bullard will be followed along to determine whether further 

evaluation is needed to establish the cause of or treat the patient's lower GI 

hemorrhage.








(2) Anemia associated with acute blood loss


Is this a current diagnosis for this admission?: Yes   


Plan: 


Patient's anemia is being addressed by transfusion of 2 units of packed red 

blood cells and serial CBCs performed every 6 hours to observe for further 

blood loss or other hematologic abnormalities.








(3) Hepatic cirrhosis


Is this a current diagnosis for this admission?: Yes   


Plan: 


Patient's hepatic cirrhosis will be observed by monitoring his metabolic panel 

on a regular basis.








(4) Leukocytosis


Qualifiers: 


   Leukocytosis type: unspecified   Qualified Code(s): D72.829 - Elevated white 

blood cell count, unspecified   


Is this a current diagnosis for this admission?: Yes   


Plan: 


Patient's leukocytosis will be observed over the course of serial CBCs 

performed on a daily basis.








(5) Flank pain


Is this a current diagnosis for this admission?: Yes   


Plan: 


Patient has been having bilateral flank pain which is not unusual for him but 

it is considerably worse than normal.  He will receive morphine sulfate IV on a 

sliding scale as needed for control of his flank pain.








- Time


Time Spent: 30 to 50 Minutes


Critical Time spent with patient: Less than 15 minutes


Medications reviewed and adjusted accordingly: Yes


Anticipated discharge: Home





- Inpatient Certification


Based on my medical assessment, after consideration of the patient's 

comorbidities, presenting symptoms, or acuity I expect that the services needed 

warrant INPATIENT care.: Yes


I certify that my determination is in accordance with my understanding of 

Medicare's requirements for reasonable and necessary INPATIENT services [42 CFR 

412.3e].: Yes


Medical Necessity: Significant Comorbidiites Make Outpatient Treatment Too Risky

, Need Close Monitoring Due to Risk of Patient Decompensation, Need for Pain 

Control, Risk of Complication if Not Cared For in Hospital

## 2018-12-13 NOTE — PDOC PROGRESS REPORT
Subjective


Progress Note for:: 12/13/18


Subjective:: 





12/13/2000 1866-year-old male with history of liver cirrhosis came to the 

emergency room with progressive generalized weakness and shortness of breath 

for more than a week.  He is also complaining of lightheadedness and occasional 

mid back pain, 1 day history of loose stools.  He was progressively become 

dizzy also complaining of nausea and vomiting in the last 24 hours.  He is also 

admitted to the ER team that he is passing large blood clots per rectum prior 

to the ER visit.  In the emergency room hemoglobin was found to be 5.7  

surgical consult was done with Dr. Bullard.  Patient was type type and 

crossmatch 1 and hospital list was called for admission here.  So far patient 

got 2 units of PRBC, 3rd unit is running now.  Test hemoglobin is 6.2.  Patient 

was hypotensive with latest blood pressure 103/48.  A rapid response was called 

this morning.  Due to concerns about hemodynamically unstable patient I 

discussed care with the patient and transferring the patient to ICU.  It was 

scheduled for colonoscopy today.


Reason For Visit: 


ACUTE LOWER GASTROINTESTINAL HEMORRHAGE








Physical Exam


Vital Signs: 


 











Temp Pulse Resp BP Pulse Ox


 


 98.5 F   81   12   103/48 L  100 


 


 12/13/18 07:26  12/13/18 07:26  12/13/18 07:26  12/13/18 07:26  12/13/18 07:26








 Intake & Output











 12/12/18 12/13/18 12/14/18





 06:59 06:59 06:59


 


Intake Total  750 0


 


Balance  750 0


 


Weight  184.8 kg 











General appearance: PRESENT: no acute distress


Head exam: PRESENT: atraumatic


Eye exam: PRESENT: PERRLA


Mouth exam: PRESENT: moist


Neck exam: ABSENT: carotid bruit, JVD, lymphadenopathy, thyromegaly


Respiratory exam: PRESENT: clear to auscultation lauren.  ABSENT: rales, rhonchi, 

wheezes


Cardiovascular exam: PRESENT: RRR.  ABSENT: diastolic murmur, rubs, systolic 

murmur


GI/Abdominal exam: PRESENT: normal bowel sounds, soft.  ABSENT: distended, 

guarding, mass, organolmegaly, rebound, tenderness


Neurological exam: PRESENT: alert, awake, oriented to person, oriented to place

, oriented to time, oriented to situation, CN II-XII grossly intact.  ABSENT: 

motor sensory deficit


Psychiatric exam: PRESENT: appropriate affect, normal mood.  ABSENT: homicidal 

ideation, suicidal ideation





Results


Laboratory Results: 


 





 12/13/18 06:20 





 12/13/18 06:20 





 











  12/13/18 12/13/18 12/13/18





  06:20 06:20 06:20


 


WBC    11.3 H


 


RBC    2.00 L


 


Hgb    6.2 L


 


Hct    18.2 L


 


MCV    91  D


 


MCH    30.9


 


MCHC    34.0


 


RDW    16.0 H


 


Plt Count    93 L


 


Seg Neutrophils %    72.5


 


Lymphocytes %    16.5


 


Monocytes %    9.7


 


Eosinophils %    0.9


 


Basophils %    0.4


 


Absolute Neutrophils    8.2


 


Absolute Lymphocytes    1.9


 


Absolute Monocytes    1.1


 


Absolute Eosinophils    0.1


 


Absolute Basophils    0.0


 


Sodium  141.9  


 


Potassium  4.0  


 


Chloride  115 H  


 


Carbon Dioxide  20 L  


 


Anion Gap  7  


 


BUN  29 H  


 


Creatinine  0.92  


 


Est GFR ( Amer)  > 60  


 


Est GFR (Non-Af Amer)  > 60  


 


Glucose  175 H  


 


Calcium  7.0 L*  


 


Magnesium  1.9  


 


TSH   2.20 


 


Free T4   0.83 


 


Free T3 pg/mL   1.59 L 











Impressions: 


 





Chest X-Ray  12/12/18 19:19


IMPRESSION:  No interval change in the chest.  No acute findings.  Pleural 

thickening in the left upper lateral chest is most likely related to the rib 

deformity.


 














Assessment & Plan





- Diagnosis


(1) Anemia associated with acute blood loss


Is this a current diagnosis for this admission?: Yes   


Plan: 


12/13/2018-patient was admitted with GI bleed surgical evaluation done in the 

ER Dr. Bullard is planning to do colonoscopy today.  Patient got 2 units of 

blood transfusion so far untoward unit is running now.  Latest hemoglobin is 

6.2.  Rapid response was called this morning.  Latest blood pressure is 103/48.

  Because of that stable condition and The Patient to ICU for Close Monitoring.

  Discussed the Case with Dr. Ackerman Today.  He Said He Is Going to Take It in 

Touch with Dr. Bullard.  I Started Him on IV Fluids Normal 775 Cc/H.   More Unit 

of PRBCs is available for blood transfusion.  Patient condition is critical in 

my opinion.








(2) Lower GI bleed


Is this a current diagnosis for this admission?: Yes   


Plan: 


12/13/2018 so far patient received 2 units of packed red blood cells, he is 

receiving the third unit of PRBC now hemoglobin is six-point.  Patient 

complains of worsening lower GI bleed with GoLYTELY is refusing to drink 

GoLYTELY.  She was requested every 6 hours.  Patient is only on mechanical 

prophylaxis.  I am going to put him on Protonix IV drip.








(3) Hepatic cirrhosis


Is this a current diagnosis for this admission?: Yes   


Plan: 


12/13/2018 patient given the history of liver failure secondary to heavy 

alcohol use.  He said he quit drinking 2-1/2 years ago.  Latest platelet count 

is 93,000.  INR is 1.37.








(4) Leukocytosis


Qualifiers: 


   Leukocytosis type: unspecified   Qualified Code(s): D72.829 - Elevated white 

blood cell count, unspecified   


Is this a current diagnosis for this admission?: Yes   


Plan: 


12/13/2018 initial WBC 17.0.  It was improved to 11.0 today.








- Time


Time Spent with patient: 25-34 minutes


Medications reviewed and adjusted accordingly: Yes

## 2018-12-13 NOTE — PDOC PROGRESS REPORT
Subjective


Progress Note for:: 12/13/18


Subjective:: 





No pains but just had some dark blood stools in holding side of the OR. No 

Complaints of pain or lightheadedness. Had 4 U PRBCs with HB up to 9.4. Had 

subsequent colonoscopy by Dr Bullard. This showed clots in the large bowel but 

no bleeding.


Reason For Visit: 


GI BLEED WITH HYPOTENSION








Physical Exam


Vital Signs: 


 











Temp Pulse Resp BP Pulse Ox


 


 98.6 F   85   20   128/78 H  100 


 


 12/13/18 18:00  12/13/18 17:50  12/13/18 18:00  12/13/18 17:50  12/13/18 18:00








 Intake & Output











 12/12/18 12/13/18 12/14/18





 06:59 06:59 06:59


 


Intake Total  750 600


 


Output Total   500


 


Balance  750 100


 


Weight  184.8 kg 87.2 kg











Exam: 





abdomen is soft and remains non tender.





Results


Laboratory Results: 


 





 12/13/18 06:20 





 











  12/13/18 12/13/18 12/13/18





  06:20 06:20 06:20


 


WBC    11.3 H


 


RBC    2.00 L


 


Hgb    6.2 L


 


Hct    18.2 L


 


MCV    91  D


 


MCH    30.9


 


MCHC    34.0


 


RDW    16.0 H


 


Plt Count    93 L


 


Seg Neutrophils %    72.5


 


Lymphocytes %    16.5


 


Monocytes %    9.7


 


Eosinophils %    0.9


 


Basophils %    0.4


 


Absolute Neutrophils    8.2


 


Absolute Lymphocytes    1.9


 


Absolute Monocytes    1.1


 


Absolute Eosinophils    0.1


 


Absolute Basophils    0.0


 


Sodium  141.9  


 


Potassium  4.0  


 


Chloride  115 H  


 


Carbon Dioxide  20 L  


 


Anion Gap  7  


 


BUN  29 H  


 


Creatinine  0.92  


 


Est GFR ( Amer)  > 60  


 


Est GFR (Non-Af Amer)  > 60  


 


Glucose  175 H  


 


Calcium  7.0 L*  


 


Magnesium  1.9  


 


TSH   2.20 


 


Free T4   0.83 


 


Free T3 pg/mL   1.59 L 














  12/13/18





  15:22


 


WBC  14.1 H


 


RBC  3.06 L


 


Hgb  9.4 L D


 


Hct  27.1 L


 


MCV  88


 


MCH  30.5


 


MCHC  34.5


 


RDW  15.6 H


 


Plt Count  75 L


 


Seg Neutrophils %  68.8


 


Lymphocytes %  21.5


 


Monocytes %  8.1


 


Eosinophils %  0.8


 


Basophils %  0.8


 


Absolute Neutrophils  9.7 H


 


Absolute Lymphocytes  3.0


 


Absolute Monocytes  1.1


 


Absolute Eosinophils  0.1


 


Absolute Basophils  0.1


 


Sodium 


 


Potassium 


 


Chloride 


 


Carbon Dioxide 


 


Anion Gap 


 


BUN 


 


Creatinine 


 


Est GFR ( Amer) 


 


Est GFR (Non-Af Amer) 


 


Glucose 


 


Calcium 


 


Magnesium 


 


TSH 


 


Free T4 


 


Free T3 pg/mL 











Impressions: 


 





Chest X-Ray  12/12/18 19:19


IMPRESSION:  No interval change in the chest.  No acute findings.  Pleural 

thickening in the left upper lateral chest is most likely related to the rib 

deformity.


 








GI Bleed Scan Nuclear Medicine  12/13/18 00:00


IMPRESSION:  NORMAL RADIONUCLIDE GASTROINTESTINAL BLEEDING STUDY.


 














Assessment & Plan





- Diagnosis


(1) Anemia associated with acute blood loss


Is this a current diagnosis for this admission?: Yes   





(2) Hepatic cirrhosis


Is this a current diagnosis for this admission?: Yes   





(3) Lower GI bleed


Is this a current diagnosis for this admission?: Yes   





(4) Cirrhosis of liver with ascites


Qualifiers: 


   Hepatic cirrhosis type: unspecified hepatic cirrhosis   Qualified Code(s): 

K74.60 - Unspecified cirrhosis of liver; R18.8 - Other ascites; R18.8 - Other 

ascites   


Is this a current diagnosis for this admission?: Yes   





- Time


Time Spent with patient: 25-34 minutes





- Inpatient Certification


Medical Necessity: Need Close Monitoring Due to Risk of Patient Decompensation, 

Need For IV Fluids, Need For Continuous Telemetry Monitoring, Risk of 

Complication if Not Cared For in Hospital





- Plan Summary


Plan Summary: 





In view of the patient being cirrhotic with ascites, he needs to be in a 

tertiary facility with good blood bank, GI, Interventional radiologist and good 

ICU, i recommend transfering him to a tertiary hospital like [facility]. This was 

discussed with the Hospitalist Dr Medrano. I called [facility] transfer and 

Hospitalist eventually made transfer to the Medical service at [facility].


Patient is stable right now. If bleeds again we may not have all the blood and 

blood products like platelets available. His platelets have dropped ffom 161 to 

75.

## 2018-12-15 NOTE — OPERATIVE REPORT
Nonrecallable Operative Report


DATE OF SURGERY: 12/13/18


PREOPERATIVE DIAGNOSIS: GI bleeding


POSTOPERATIVE DIAGNOSIS: 1. GI bleeding, without active source identified in 

the colon.  2.  Large amount of old blood in the colon.  3.  Poor prep


OPERATION: Colonoscopy to the cecum


SURGEON: MARY GODFREY


ANESTHESIA: LMAC


TISSUE REMOVED OR ALTERED: None


COMPLICATIONS: 





None apparent


ESTIMATED BLOOD LOSS: Large amount of old blood in the colon


PROCEDURE: 





Procedure in detail: After informed consent was obtained from the patient, he 

was laid in the left lateral decubitus position.  The colonoscope was inserted 

into the rectum.  Immediately there was noted to be a large amount of old blood 

and stool in the rectum.  The scope was pushed up the rectum, sigmoid colon, 

descending colon, across the transverse colon, down the ascending colon, and 

into the cecum.  The ileocecal valve and appendiceal orifice were identified.  

The scope was then withdrawn, circumferentially noting the mucosa.  The prep 

was very poor.  The patient had stool with old blood throughout the colon.  The 

scope was pulled back, irrigating copious amounts of saline throughout the 

colon.  The scope was brought back past the ascending colon, transverse colon, 

down the descending colon, sigmoid colon, and into the rectum.  No active 

bleeding could be identified throughout the colon.  No large clots were 

identified, although there was a large amount of old blood and stool.  No 

significant diverticulosis was identified.  A retroflexion maneuver was 

performed in the rectum, noting no enlarged internal hemorrhoids.  Air was 

suctioned from the rectum, the scope was removed from the patient, and the 

procedure was concluded.  All sponge, instrument, and needle counts were 

correct x2.





Condition: Fair.

## 2019-01-16 NOTE — RADIOLOGY REPORT (SQ)
EXAM DESCRIPTION:  CHEST SINGLE VIEW



COMPLETED DATE/TIME:  1/16/2019 5:33 pm



REASON FOR STUDY:  CHEST TUBE PLACEMENT



COMPARISON:  AP chest 1/16/2019, 12/12/2018



EXAM PARAMETERS:  NUMBER OF VIEWS: One view.

TECHNIQUE: Single frontal radiographic view of the chest acquired.

RADIATION DOSE: NA

LIMITATIONS: None.



FINDINGS:  LUNGS AND PLEURA: Interval placement of a right lung apical Heimlich tube, with resolved r
ight pneumothorax.

There is persistent collapse of the right middle and lower lobe.  No right pleural effusion.

Left lung well inflated and clear.

MEDIASTINUM AND HILAR STRUCTURES: No masses.  Contour normal.

HEART AND VASCULAR STRUCTURES: Heart normal in size.  Normal vasculature.

BONES: Old left upper rib fractures

HARDWARE: Right apical Heimlich tube

OTHER: No other significant finding.



IMPRESSION:  Interval placement of small bore right chest tube in the upper right hemithorax.  Right 
pneumothorax has resolved.

Complete collapse right middle and lower lobe.



TECHNICAL DOCUMENTATION:  JOB ID:  [id number]

 2011 [employer]- All Rights Reserved



Reading location - IP/workstation name: [email or web address]

## 2019-01-16 NOTE — PDOC CONSULTATION
History of Present Illness


Admission Date/PCP: 


  01/16/19 18:33





  





History of Present Illness: 


KEKE CHAMBERS III is a 66 year old  male patient with past medical

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 

history of shortness of breath.  At his primary gastroenterologist office 

patient found to have spontaneous pneumothorax with 80% collapse of his lung.  

At ER Dr. Kelly put chest drainage.  The hospitalist service consulted for 

comanagement of his medical problems.








Past Medical History


Cardiac Medical History: Reports: Hypertension


GI Medical History: Reports: Cirrhosis, Gastroesophageal Reflux Disease


Hematology: 


   Denies: Anemia, Bleeding Tendencies





Past Surgical History


Past Surgical History: Reports: Orthopedic Surgery - L arm, Other - Arm 

orthopedic surgery in the remote past.





Social History


Lives with: Family


Smoking Status: Current Every Day Smoker


Frequency of Alcohol Use: Heavy - but quit, was drinking 7-10 beers/day


Hx Recreational Drug Use: Yes - no IVDA, afraid of needles


Drugs: Marijuana


Hx Prescription Drug Abuse: No





Family History


Family History: Reviewed & Not Pertinent, Malignancy - Father with colon cancer 

in his 50s.


Parental Family History Reviewed: Yes


Children Family History Reviewed: Yes


Sibling(s) Family History Reviewed.: Yes





Medication/Allergy


Home Medications: 








Multivitamin [Multivitamins] 1 tab PO DAILY 05/23/17 


Omeprazole 20 mg PO DAILY 05/23/17 


Docusate Sodium [Colace 100 mg Capsule] 100 mg PO BID #60 capsule 06/02/17 


Ferrous Sulfate [Feosol 325 mg Tablet] 325 mg PO MEALS #90 tablet 06/02/17 


Fish Oil/Dha/Epa [Fish Oil 1,200 mg Fish Oil] 2,400 mg PO QPM 12/13/18 


Propranolol HCl [Inderal 10 mg Tablet] 20 mg PO QHS 12/13/18 


Propranolol HCl [Inderal 10 mg Tablet] 30 mg PO QAM 12/13/18 








Allergies/Adverse Reactions: 


                                        





No Known Allergies Allergy (Verified 01/16/19 16:45)


   











Review of Systems


Constitutional: PRESENT: as per HPI


Cardiovascular: ABSENT: chest pain, dyspnea on exertion, edema, orthropnea, 

palpitations


Gastrointestinal: ABSENT: abdominal pain, constipation, diarrhea, hematemesis, 

hematochezia, nausea, vomiting


Neurological: ABSENT: abnormal gait, abnormal speech, confusion, dizziness, 

focal weakness, syncope





Physical Exam


Vital Signs: 


                                        











Temp Pulse Resp BP Pulse Ox


 


    66      114/78   93 


 


    01/16/19 16:50     01/16/19 16:50  01/16/19 16:50








                                 Intake & Output











 01/15/19 01/16/19 01/17/19





 06:59 06:59 06:59


 


Weight   77.3 kg











General appearance: PRESENT: no acute distress


Head exam: PRESENT: atraumatic


Neck exam: ABSENT: carotid bruit, JVD, lymphadenopathy, thyromegaly


Respiratory exam: PRESENT: decreased breath sounds - Right lung


Cardiovascular exam: PRESENT: RRR.  ABSENT: diastolic murmur, rubs, systolic 

murmur


GI/Abdominal exam: PRESENT: other - Caput medusae


Neurological exam: PRESENT: alert, awake





Results


Laboratory Results: 


                                        





                                 01/16/19 16:57 





                                 01/16/19 16:57 





                                        











  01/16/19 01/16/19





  16:57 16:57


 


WBC  5.0 


 


RBC  4.79 


 


Hgb  14.4 


 


Hct  41.2 


 


MCV  86 


 


MCH  30.0 


 


MCHC  34.9 


 


RDW  17.4 H 


 


Plt Count  101 L 


 


Seg Neutrophils %  40.4 L 


 


Lymphocytes %  40.6 


 


Monocytes %  9.5 


 


Eosinophils %  8.3 H 


 


Basophils %  1.2 


 


Absolute Neutrophils  2.0 


 


Absolute Lymphocytes  2.0 


 


Absolute Monocytes  0.5 


 


Absolute Eosinophils  0.4 


 


Absolute Basophils  0.1 


 


Sodium   140.1


 


Potassium   4.8


 


Chloride   106


 


Carbon Dioxide   27


 


Anion Gap   7


 


BUN   16


 


Creatinine   0.78


 


Est GFR ( Amer)   > 60


 


Est GFR (Non-Af Amer)   > 60


 


Glucose   108


 


Calcium   9.4


 


Total Bilirubin   2.2 H


 


AST   68 H


 


ALT   21


 


Alkaline Phosphatase   96


 


Total Protein   7.5


 


Albumin   4.1








                                        











  01/16/19 01/16/19





  16:57 16:57


 


Creatine Kinase  50 L 


 


CK-MB (CK-2)   1.23


 


Troponin I   < 0.012











Impressions: 


                                        





Chest X-Ray  01/16/19 00:00


IMPRESSION:  Interval placement of small bore right chest tube in the upper 

right hemithorax.  Right pneumothorax has resolved.


Complete collapse right middle and lower lobe.


 














Assessment & Plan





- Diagnosis


(1) Pneumothorax on right


Is this a current diagnosis for this admission?: Yes   


Plan: 


Acute spontaneous pneumothorax status post chest drainage.








(2) Hypertension


Qualifiers: 


   Hypertension type: essential hypertension   Qualified Code(s): I10 - 

Essential (primary) hypertension   


Is this a current diagnosis for this admission?: Yes   


Plan: 


Continue home medication








(3) Cirrhosis of liver with ascites


Qualifiers: 


   Hepatic cirrhosis type: unspecified hepatic cirrhosis   Qualified Code(s): 

K74.60 - Unspecified cirrhosis of liver; R18.8 - Other ascites; R18.8 - Other 

ascites   


Is this a current diagnosis for this admission?: Yes   


Plan: 


Compensated

## 2019-01-16 NOTE — OPERATIVE REPORT
Nonrecallable Operative Report


DATE OF SURGERY: 01/16/19


PREOPERATIVE DIAGNOSIS: right spontaneous pneumothorax


POSTOPERATIVE DIAGNOSIS: same


OPERATION: right thoracostomy tuibe placbaljit


SURGEON: ALVINA SCHULTE


ANESTHESIA: Local


TISSUE REMOVED OR ALTERED: n/a


COMPLICATIONS: 





n/a


ESTIMATED BLOOD LOSS: < 1 mL


INTRAOPERATIVE FINDINGS: gush of air heard after insertion of the right 

thoracostomy tube


PROCEDURE: 





see dictation

## 2019-01-16 NOTE — RADIOLOGY REPORT (SQ)
EXAM DESCRIPTION:  CHEST 2 VIEWS



COMPLETED DATE/TIME:  1/16/2019 4:35 pm



REASON FOR STUDY:  SHORTNESS OF BREATH R06.02  SHORTNESS OF BREATH I85.00  ESOPHAGEAL VARICES WITHOUT
 BLEEDING



COMPARISON:  None.



NUMBER OF VIEWS:  Two view.



TECHNIQUE:  Frontal and lateral radiographic views of the chest acquired.



LIMITATIONS:  None.



FINDINGS:  LUNGS AND PLEURA: Hyperinflation.  Large right pneumothorax.  Chronic pleural thickening i
n the upper left chest.

MEDIASTINUM AND HILAR STRUCTURES: No masses.  No contour abnormalities.

HEART AND VASCULAR STRUCTURES: Heart normal in size and contour.  No evidence for failure.

BONES: No acute findings.  Degenerative changes in the spine.

HARDWARE: None in the chest.

OTHER: No other significant finding.



IMPRESSION:

1. LARGE RIGHT PNEUMOTHORAX.  AT THE TIME OF THE EXAM THE PATIENT WAS TAKEN DIRECTLY TO THE EMERGENCY
 ROOM AND THE EMERGENCY ROOM CHARGE NURSE WAS NOTIFIED THAT THE PATIENT HAD A LARGE PNEUMOTHORAX AND 
WOULD REQUIRE A CHEST TUBE.

2. COPD.  CHRONIC PLEURAL THICKENING IN THE UPPER LEFT CHEST.



TECHNICAL DOCUMENTATION:  JOB ID:  [id number]

 2011 [employer]- All Rights Reserved



Reading location - IP/workstation name: [facility]

## 2019-01-16 NOTE — PDOC H&P
History of Present Illness


Admission Date/PCP: 


1/16/19


Patient complains of: Shortness of breath


History of Present Illness: 


KEKE CHAMBERS III is a 66 year old male with HCV infection, liver 

cirrhosis, s/p TIPS at [facility] on 12/17/18.  He has been c/o SOB x 1 

week, he saw his gastroenterologist today and a Chest Xray was ordered which 

demonstrated an 80% collapsed right lung.








Past Medical History


Cardiac Medical History: Reports: Hypertension


GI Medical History: Reports: Cirrhosis, Gastroesophageal Reflux Disease


Hematology: 


   Denies: Anemia, Bleeding Tendencies





Past Surgical History


Past Surgical History: Reports: Orthopedic Surgery - L arm, Other - Arm 

orthopedic surgery in the remote past.





Social History


Lives with: Family


Smoking Status: Current Every Day Smoker


Frequency of Alcohol Use: Heavy - but quit, was drinking 7-10 beers/day


Hx Recreational Drug Use: Yes - no IVDA, afraid of needles


Drugs: Marijuana


Hx Prescription Drug Abuse: No





Family History


Family History: Reviewed & Not Pertinent, Malignancy - Father with colon cancer 

in his 50s.


Parental Family History Reviewed: No


Children Family History Reviewed: No


Sibling(s) Family History Reviewed.: No





Medication/Allergy


Home Medications: 








Multivitamin [Multivitamins] 1 tab PO DAILY 05/23/17 


Omeprazole 20 mg PO DAILY 05/23/17 


Docusate Sodium [Colace 100 mg Capsule] 100 mg PO BID #60 capsule 06/02/17 


Ferrous Sulfate [Feosol 325 mg Tablet] 325 mg PO MEALS #90 tablet 06/02/17 


Fish Oil/Dha/Epa [Fish Oil 1,200 mg Fish Oil] 2,400 mg PO QPM 12/13/18 


Propranolol HCl [Inderal 10 mg Tablet] 20 mg PO QHS 12/13/18 


Propranolol HCl [Inderal 10 mg Tablet] 30 mg PO QAM 12/13/18 








Allergies/Adverse Reactions: 


                                        





No Known Allergies Allergy (Verified 01/16/19 16:45)


   











Physical Exam


Vital Signs: 


                                        











Temp Pulse Resp BP Pulse Ox


 


    66      114/78   93 


 


    01/16/19 16:50     01/16/19 16:50  01/16/19 16:50








                                 Intake & Output











 01/15/19 01/16/19 01/17/19





 06:59 06:59 06:59


 


Weight   77.3 kg











General appearance: PRESENT: mild distress


Head exam: PRESENT: atraumatic


Eye exam: PRESENT: EOMI


Mouth exam: PRESENT: dry mucosa, neck supple


Neck exam: PRESENT: full ROM


Cardiovascular exam: PRESENT: other - no breath sounds on the Left


GI/Abdominal exam: PRESENT: soft


Rectal exam: PRESENT: deferred


Skin exam: PRESENT: warm





Results


Laboratory Results: 


                                        





                                 01/16/19 16:57 





                                 01/16/19 16:57 





                                        











  01/16/19 01/16/19





  16:57 16:57


 


WBC  5.0 


 


RBC  4.79 


 


Hgb  14.4 


 


Hct  41.2 


 


MCV  86 


 


MCH  30.0 


 


MCHC  34.9 


 


RDW  17.4 H 


 


Plt Count  101 L 


 


Seg Neutrophils %  40.4 L 


 


Lymphocytes %  40.6 


 


Monocytes %  9.5 


 


Eosinophils %  8.3 H 


 


Basophils %  1.2 


 


Absolute Neutrophils  2.0 


 


Absolute Lymphocytes  2.0 


 


Absolute Monocytes  0.5 


 


Absolute Eosinophils  0.4 


 


Absolute Basophils  0.1 


 


Sodium   140.1


 


Potassium   4.8


 


Chloride   106


 


Carbon Dioxide   27


 


Anion Gap   7


 


BUN   16


 


Creatinine   0.78


 


Est GFR ( Amer)   > 60


 


Est GFR (Non-Af Amer)   > 60


 


Glucose   108


 


Calcium   9.4


 


Total Bilirubin   2.2 H


 


AST   68 H


 


ALT   21


 


Alkaline Phosphatase   96


 


Total Protein   7.5


 


Albumin   4.1








                                        











  01/16/19 01/16/19





  16:57 16:57


 


Creatine Kinase  50 L 


 


CK-MB (CK-2)   1.23


 


Troponin I   < 0.012











Impressions: 


                                        





Chest X-Ray  01/16/19 00:00


IMPRESSION:  Interval placement of small bore right chest tube in the upper 

right hemithorax.  Right pneumothorax has resolved.


Complete collapse right middle and lower lobe.


 














Assessment & Plan





- Diagnosis


(1) Pneumothorax on right


Is this a current diagnosis for this admission?: Yes   





(2) Hepatic cirrhosis


Qualifiers: 


   Hepatic cirrhosis type: other cirrhosis   Qualified Code(s): K74.69 - Other 

cirrhosis of liver   


Is this a current diagnosis for this admission?: Yes   





- Plan Summary


Plan Summary: 





A/





right spontaneous pneumothorax


liver cirrhosis secondary to HCV


S/P TIPS


S/P right thoracostomy tube placement in the ER


Chest Xray shows resolution of the right pneumothorax with collapse right middle

and lower lobe


Pleurovac shows no air leak











P/





Placement right thoracostomy tube in the ER (procedure, risks, benefit explained

to the patient, he understands and decides to proceed)


Admission following above


Pulmonary consult for residual right middle and lower lobe collapse for possible

bronchoscopy to resolve possible mucus plug 


daily Chest Xray

## 2019-01-16 NOTE — ER DOCUMENT REPORT
ED Respiratory Problem





- General


Mode of Arrival: Wheelchair


Information source: Patient


TRAVEL OUTSIDE OF THE [place] IN LAST 30 DAYS: No





<GISSELLE SINGH - Last Filed: 01/16/19 17:45>





<TOY BAUTISTA - Last Filed: 01/16/19 18:35>





- General


Chief Complaint: Shortness Of Breath


Stated Complaint: SHORT OF BREATH


Time Seen by Provider: 01/16/19 16:49


Notes: 





66-year-old male who presents to the emergency department today with complaints 

of shortness of breath for the last x5 days.  Patient had an outpatient x-ray 

performed just prior to arrival today which showed a nearly completely collapsed

right lung.  (GISSELLE SINGH)





- Related Data


Allergies/Adverse Reactions: 


                                        





No Known Allergies Allergy (Verified 01/16/19 16:45)


   











Past Medical History





- General


Information source: Patient, [facility] Records





- Social History


Smoking Status: Former Smoker


Cigarette use (# per day): No


Frequency of alcohol use: Heavy - history of heavy abuse, no alcohol in several 

years


Drug Abuse: None


Lives with: Family


Family History: Reviewed & Not Pertinent, Malignancy - Father with colon cancer 

in his 50s.





- Past Medical History


Cardiac Medical History: Reports: Hx Hypertension


GI Medical History: Reports: Hx Cirrhosis, Hx Gastroesophageal Reflux Disease


Past Surgical History: Reports: Hx Orthopedic Surgery - L arm, Other - Arm 

orthopedic surgery in the remote past.





- Immunizations


Hx Diphtheria, Pertussis, Tetanus Vaccination: Yes


Hx Pneumococcal Vaccination: 05/01/17





<GISSELLE SINGH - Last Filed: 01/16/19 17:45>





Review of Systems





- Review of Systems


Constitutional: No symptoms reported


EENT: No symptoms reported


Cardiovascular: No symptoms reported


Respiratory: See HPI, Short of breath


Gastrointestinal: No symptoms reported


Genitourinary: No symptoms reported


Male Genitourinary: No symptoms reported


Musculoskeletal: No symptoms reported


Skin: No symptoms reported


Hematologic/Lymphatic: No symptoms reported


Neurological/Psychological: No symptoms reported


-: Yes All other systems reviewed and negative





<GISSELLE SINGH - Last Filed: 01/16/19 17:45>





Physical Exam





<GISSELLE SINGH - Last Filed: 01/16/19 17:45>





- Vital signs


Vitals: 


                                        











Pulse BP Pulse Ox


 


 66   114/78   93 


 


 01/16/19 16:50  01/16/19 16:50  01/16/19 16:50














- Notes


Notes: 





Physical Exam:


 


General: Alert, appears uncomfortable. 


 


HEENT: Normocephalic. Atraumatic. PERRL. Extraocular movements intact. 

Oropharynx clear.


 


Neck: Supple. Non-tender.


 


Respiratory: No respiratory distress.  Absent breath sounds on the right, rales 

and crackles on the left.


 


Cardiovascular: Regular rate and rhythm. 


 


Abdominal: Caput medusa.Normal Bowel Sounds. 


 


Back: Non-tender. No deformity or step off.


 


Extremities: Moves all four extremities.


Upper extremities: Normal inspection. Normal ROM.  


Lower extremities: Normal inspection. No edema. Normal ROM.


 


Neurological: Normal cognition. AAOx4. Normal speech.  


 


Psychological: Normal affect. Normal Mood. 


 


Skin: Warm. Dry. Normal color. (GISSELLE SINGH)





Course





- Laboratory


Result Diagrams: 


                                 01/16/19 16:57





                                 01/16/19 16:57





<GISSELLE SINGH - Last Filed: 01/16/19 17:45>





- Laboratory


Result Diagrams: 


                                 01/16/19 16:57





                                 01/16/19 16:57





- Diagnostic Test


Radiology reviewed: Image reviewed, Reports reviewed - Large right pneumothorax,

COPD, left pleural scarring.





- EKG Interpretation by Me


EKG shows normal: Sinus rhythm, Axis, QRS Complexes, ST-T Waves.  abnormal: 

Intervals - Borderline prolonged QT interval


Rate: Normal - 73


Rhythm: NSR





- Consults


  ** Dr. Kelly


Time consulted: 16:45


Consulted provider: will come to ER





<TOY BAUTISTA - Last Filed: 01/16/19 18:35>





- Vital Signs


Vital signs: 


                                        











Temp Pulse Resp BP Pulse Ox


 


    66      114/78   93 


 


    01/16/19 16:50     01/16/19 16:50  01/16/19 16:50














- Laboratory


Laboratory results interpreted by me: 


                                        











  01/16/19 01/16/19 01/16/19





  16:57 16:57 16:57


 


RDW  17.4 H  


 


Plt Count  101 L  


 


Seg Neutrophils %  40.4 L  


 


Eosinophils %  8.3 H  


 


PT    16.3 H


 


Total Bilirubin   2.2 H 


 


Direct Bilirubin   0.6 H 


 


AST   68 H 


 


Creatine Kinase   50 L 














Critical Care Note





- Critical Care Note


Total time excluding time spent on procedures (mins): 30





<TOY BAUTISTA - Last Filed: 01/16/19 18:35>





Discharge





<GISSELLE SINGH - Last Filed: 01/16/19 17:45>





- Discharge


Admitting Provider: Surgicalist


Unit Admitted: Telemetry





<TOY BAUTISTA - Last Filed: 01/16/19 18:35>





- Discharge


Clinical Impression: 


 Pneumothorax on right, Thrombocytopenia





Hepatic cirrhosis


Qualifiers:


 Hepatic cirrhosis type: other cirrhosis Qualified Code(s): K74.69 - Other 

cirrhosis of liver





Condition: Stable


Disposition: ADMITTED AS INPATIENT


Referrals: 


NAYELY KULKARNI MD [NO LOCAL MD] - Follow up as needed


Scribe Attestation: 





01/16/19 18:26


I personally performed the services described in the documentation, reviewed and

edited the documentation which was dictated to the scribe in my presence, and it

accurately records my words and actions. (TOY BAUTISTA)





Scribe Documentation





- Scribe


Written by Scribe:: Fatoumata Herrera, 1/16/2019 1700


acting as scribe for :: Kody





<GISSELLE SINGH - Last Filed: 01/16/19 17:45>

## 2019-01-17 NOTE — OPERATIVE REPORT E
Operative Report



NAME: KEKE CHAMBERS III

MRN:  [id number]          : 1952 AGE:  66Y

DATE OF SURGERY:  2019              ROOM: 401



PREOPERATIVE DIAGNOSIS:

RIGHT PNEUMOTHORAX, SPONTANEOUS.



POSTOPERATIVE DIAGNOSIS:

RIGHT PNEUMOTHORAX, SPONTANEOUS.



OPERATION:

Placement of right thoracostomy tube.



SURGEON:

ALVINA SCHULTE M.D.



ASSISTANT:

None.



BLEEDING:

None.



COMPLICATIONS:

None.



ANESTHESIA:

10 mL of 1% lidocaine without epinephrine



FLUIDS:

None available.



INDICATION AND FINDINGS:

This is a 66-year-old male with a history of cirrhosis secondary to C

hepatitis who underwent a TIPS procedure back on 2018 at [facility].  The patient has been complaining of shortness of breath for

about 1 week.  He was seen by his gastroenterologist today, who ordered a

chest x-ray because of the patient's complaints of shortness of breath. 

The chest x-ray yielded a large pneumothorax on the right side.  A

decision was made to put a chest tube in the right side.  The procedure, risks, 

benefits, and complications explained to the patient.  He understands and

decides to proceed.



PROCEDURE:

The procedure was done in the operating room.  The patient was placed in a

semi-Chisholm position.  The right upper chest prepped and draped in usual

fashion.  The area located along the second intercostal space along the

midclavicular line was chosen, infiltrated with lidocaine.  A stab wound

was made at the level of the second intercostal space following local

anesthesia with 1% lidocaine without epinephrine, and a 16-gauge needle

was inserted through the skin, the muscle planes, into the right thoracic

cavity.  A gush of air was heard.  The trocar was pulled back as the

catheter was fully advanced, and secured to the skin with a silk suture

and connected to an intermittent valve and Pleur-Evac, with good fluctuation

of the air chamber fluid within the Pleur-Evac, and no air leak was noted. The

catheter was secured to the skin with silk sutures.  Sterile dressings

applied.



The patient tolerated the procedure well.  A chest x-ray was obtained to

confirm position of the chest tube.







DICTATING PHYSICIAN:  ALVINA SCHULTE M.D.





[id number]                  DT: 2019    [id number]

PHY#: [id number]            DD: 2019    1728

ID:   [id number]           JOB#: [id number]       ACCT: [id number]



cc:ALVINA SCHULTE M.D.

>







GERI

## 2019-01-17 NOTE — PDOC PROGRESS REPORT
Subjective


Progress Note for:: 01/17/19


Subjective:: 





comfortable, no SOB


Reason For Visit: 


R SIDE PNEUNO








Physical Exam


Vital Signs: 


                                        











Temp Pulse Resp BP Pulse Ox


 


 98.4 F   69   20   111/59 L  92 


 


 01/17/19 03:13  01/17/19 07:00  01/17/19 03:13  01/17/19 03:13  01/17/19 03:13








                                 Intake & Output











 01/16/19 01/17/19 01/18/19





 06:59 06:59 06:59


 


Intake Total  1236 1000


 


Output Total  350 


 


Balance  886 1000


 


Weight  83.2 kg 











Respiratory exam: PRESENT: clear to auscultation lauren, other - right anterior 

chest tube in place, no air leak





Results


Laboratory Results: 


                                        





                                 01/17/19 04:38 





                                 01/17/19 04:38 





                                        











  01/16/19 01/16/19 01/17/19





  16:57 16:57 04:38


 


WBC  5.0   4.3


 


RBC  4.79   3.96 L


 


Hgb  14.4   12.0 L D


 


Hct  41.2   33.9 L


 


MCV  86   86


 


MCH  30.0   30.4


 


MCHC  34.9   35.5


 


RDW  17.4 H   17.6 H


 


Plt Count  101 L   73 L


 


Seg Neutrophils %  40.4 L   51.9


 


Lymphocytes %  40.6   34.0


 


Monocytes %  9.5   6.9


 


Eosinophils %  8.3 H   6.3 H


 


Basophils %  1.2   0.9


 


Absolute Neutrophils  2.0   2.3


 


Absolute Lymphocytes  2.0   1.5


 


Absolute Monocytes  0.5   0.3


 


Absolute Eosinophils  0.4   0.3


 


Absolute Basophils  0.1   0.0


 


Sodium   140.1 


 


Potassium   4.8 


 


Chloride   106 


 


Carbon Dioxide   27 


 


Anion Gap   7 


 


BUN   16 


 


Creatinine   0.78 


 


Est GFR ( Amer)   > 60 


 


Est GFR (Non-Af Amer)   > 60 


 


Glucose   108 


 


Calcium   9.4 


 


Total Bilirubin   2.2 H 


 


AST   68 H 


 


ALT   21 


 


Alkaline Phosphatase   96 


 


Total Protein   7.5 


 


Albumin   4.1 


 


Urine Color   


 


Urine Appearance   


 


Urine pH   


 


Ur Specific Gravity   


 


Urine Protein   


 


Urine Glucose (UA)   


 


Urine Ketones   


 


Urine Blood   


 


Urine Nitrite   


 


Ur Leukocyte Esterase   


 


Urine WBC (Auto)   


 


Urine RBC (Auto)   














  01/17/19 01/17/19





  04:38 05:50


 


WBC  


 


RBC  


 


Hgb  


 


Hct  


 


MCV  


 


MCH  


 


MCHC  


 


RDW  


 


Plt Count  


 


Seg Neutrophils %  


 


Lymphocytes %  


 


Monocytes %  


 


Eosinophils %  


 


Basophils %  


 


Absolute Neutrophils  


 


Absolute Lymphocytes  


 


Absolute Monocytes  


 


Absolute Eosinophils  


 


Absolute Basophils  


 


Sodium  139.4 


 


Potassium  3.8  D 


 


Chloride  112 H 


 


Carbon Dioxide  22 


 


Anion Gap  5 


 


BUN  15 


 


Creatinine  0.56 


 


Est GFR ( Amer)  > 60 


 


Est GFR (Non-Af Amer)  > 60 


 


Glucose  64 L 


 


Calcium  8.2 L 


 


Total Bilirubin  1.6 H 


 


AST  42 


 


ALT  29 


 


Alkaline Phosphatase  65 


 


Total Protein  5.3 L 


 


Albumin  2.7 L 


 


Urine Color   JODY


 


Urine Appearance   SLIGHTLY-CLOUDY


 


Urine pH   5.0


 


Ur Specific Gravity   1.028


 


Urine Protein   30 H


 


Urine Glucose (UA)   NEGATIVE


 


Urine Ketones   TRACE H


 


Urine Blood   NEGATIVE


 


Urine Nitrite   NEGATIVE


 


Ur Leukocyte Esterase   NEGATIVE


 


Urine WBC (Auto)   1


 


Urine RBC (Auto)   1








                                        











  01/16/19 01/16/19





  16:57 16:57


 


Creatine Kinase  50 L 


 


CK-MB (CK-2)   1.23


 


Troponin I   < 0.012











Impressions: 


                                        





Chest X-Ray  01/16/19 00:00


IMPRESSION:  Interval placement of small bore right chest tube in the upper 

right hemithorax.  Right pneumothorax has resolved.


Complete collapse right middle and lower lobe.


 














Assessment & Plan





- Diagnosis


(1) Pneumothorax on right


Is this a current diagnosis for this admission?: Yes   





(2) Hepatic cirrhosis


Qualifiers: 


   Hepatic cirrhosis type: other cirrhosis   Qualified Code(s): K74.69 - Other 

cirrhosis of liver   


Is this a current diagnosis for this admission?: Yes   





- Plan Summary


Plan Summary: 





A/





S/p right chest tube placement for right large pneumothorax


Pneumothorax resolved after chest tube placement; however, the right middle and 

lower lobe are collapsed


Chest Xray pending today


Pulmonary consultation requested for possible bronchoscopy


Npo air leak noted in Pleurovac canester








P/





Patient can be discharged to home with Heilmich valve once right lung is fully 

expanded as per Pulmonary


Resume diet after Dr. Arredondo evaluation today

## 2019-01-17 NOTE — PDOC CONSULTATION
Consultation


Consult Date: 19


Attending physician:: ALVINA SCHULTE


Consult reason:: Right-sided pneumothorax





History of Present Illness


Admission Date/PCP: 


  19 18:33





  





History of Present Illness: 


KEKE CHAMBERS III is a 66 year old male presented to emergency room after 3

days of progressive shortness of breath and some tightness in his chest 

subsequent x-ray revealed that he had a right-sided pneumothorax and he was 

given an anterior chest tube by Dr. Schulte.  After initial reinflation of the 

chest tube it appeared he still has some right lower lobe atelectasis.  However 

reviewing the x-ray this morning with the radiologist appeared the right lower 

lobe atelectasis as subsequently was resolved and patient denies shortness of 

breath or dyspnea on exertion and prior to release acute exam has occasional 

cough but denies hemoptysis his PPD has been negative dates unknown he denies 

any history of chronic lung disease as a child or adolescent he missed exposure 

large amounts of passive smoke as a child as well as an adult he is self has 

smoked 1/2 packs a day for approximately 46 years but is no longer smoking or 

drinking he worked for the last 46 years as a  exposed large amounts of 

chemicals as well as passive smoke he has 1 dog no recent travel no angina-like 

chest pain sleeps on 2 pillows no PND no nocturnal cough no edema is unaware of 

any snoring but admits to restless sleep nocturia 2-3 times per night he denies 

unrestful sleep or excessive daytime somnolence.  Mild thank has been 

recommended his weight is scared


Of some





Past Medical History


Cardiac Medical History: Reports: Hypertension


Pulmonary Medical History: Reports: Chronic Obstructive Pulmonary Disease (COPD)


   Denies: Intubation, Sleep Apnea, Tuberculosis


EENT Medical History: 


   Denies: Cataracts


Neurological Medical History: 


   Denies: Hemorrhagic CVA, Ischemic CVA, Migraine, Multiple Sclerosis, Seizures


GI Medical History: Reports: Cirrhosis, Gastroesophageal Reflux Disease


Hematology: 


   Denies: Anemia, Bleeding Tendencies





Past Surgical History


Past Surgical History: Reports: Orthopedic Surgery - L arm, Other - Arm 

orthopedic surgery in the remote past.





Social History


Information Source: Patient


Have you worked as/with:: 


Lives with: Family


Smoking Status: Former Smoker


Cigarettes Packs Per Day: 35


Number of Years Smokin


Last Time Smoked: 2005


Passive smoke exposure as: Child, Adult, Both


Frequency of Alcohol Use: None


Hx Recreational Drug Use: No


Drugs: None


Hx Prescription Drug Abuse: No


Do you have pets?: Yes


Have you had any respiratory illnesses as a child?: No


Have you been exposed to any sick contacts recently?: No


Have you had any recent respiratory illnesses?: Yes


Have you travelled outside of [place] in the past 12 months?: No





- Advance Directive


Resuscitation Status: Full Code





Family History


Family History: CAD, Malignancy - Father with colon cancer in his 50s.


Parental Family History Reviewed: Yes


Children Family History Reviewed: Yes


Sibling(s) Family History Reviewed.: Yes





Medication/Allergy


Home Medications: 








Docusate Sodium [Colace 100 mg Capsule] 100 mg PO BID 19 


Polyethylene Glycol 3350 [Miralax Powder 17 gm/Packet] 17 gm PO DAILY 19 


Propranolol HCl [Inderal 10 mg Tablet] 20 mg PO QHS 19 


Propranolol HCl [Inderal 10 mg Tablet] 30 mg PO DAILY 19 








Allergies/Adverse Reactions: 


                                        





No Known Allergies Allergy (Verified 19 16:45)


   











Review of Systems


Constitutional: ABSENT: chills, headache(s), weight gain


Eyes: ABSENT: visual disturbances


Ears: ABSENT: hearing changes


Nose, Mouth, and Throat: ABSENT: mouth pain, sore throat


Cardiovascular: PRESENT: dyspnea on exertion


Respiratory: PRESENT: dyspnea.  ABSENT: hemoptysis


Gastrointestinal: ABSENT: abdominal pain, coffee ground emesis, dysphagia, 

hematemesis, hematochezia


Genitourinary: PRESENT: as per HPI


Musculoskeletal: ABSENT: deformity


Integumentary: ABSENT: pruritus, rash


Neurological: PRESENT: convulsions.  ABSENT: abnormal gait, abnormal movements, 

abnormal speech, confusion, memory loss, numbness, paresthesias


Psychiatric: ABSENT: hallucinations, homidical ideation, suicidal ideation


Endocrine: ABSENT: cold intolerance, heat intolerance, polydipsia


Hematologic/Lymphatic: ABSENT: easy bruising, lymphadenopathy


Allergic/Immunologic: ABSENT: seasonal rhinorrhea





Physical Exam


Vital Signs: 


                                        











Temp Pulse Resp BP Pulse Ox


 


 98.1 F   68   16   112/51 L  96 


 


 19 08:04  19 08:04  19 08:04  19 08:04  19 08:04








                                 Intake & Output











 01/16/19 01/17/19 01/18/19





 06:59 06:59 06:59


 


Intake Total  1236 1000


 


Output Total  350 


 


Balance  886 1000


 


Weight  83.2 kg 











General appearance: PRESENT: no acute distress, cooperative, disheveled, well-

developed, well-nourished


Head exam: PRESENT: atraumatic, normocephalic


Eye exam: PRESENT: conjunctiva pale.  ABSENT: nystagmus, periorbital swelling


Mouth exam: PRESENT: dry mucosa, neck supple


Neck exam: ABSENT: carotid bruit, JVD, lymphadenopathy, thyromegaly, tracheal 

deviation, tracheostomy


Respiratory exam: PRESENT: decreased breath sounds, prolonged expiratory phas





Results


Laboratory Results: 


                                        





                                 19 04:38 





                                 19 04:38 





                                        











  19





  16:57 16:57 04:38


 


WBC  5.0   4.3


 


RBC  4.79   3.96 L


 


Hgb  14.4   12.0 L D


 


Hct  41.2   33.9 L


 


MCV  86   86


 


MCH  30.0   30.4


 


MCHC  34.9   35.5


 


RDW  17.4 H   17.6 H


 


Plt Count  101 L   73 L


 


Seg Neutrophils %  40.4 L   51.9


 


Lymphocytes %  40.6   34.0


 


Monocytes %  9.5   6.9


 


Eosinophils %  8.3 H   6.3 H


 


Basophils %  1.2   0.9


 


Absolute Neutrophils  2.0   2.3


 


Absolute Lymphocytes  2.0   1.5


 


Absolute Monocytes  0.5   0.3


 


Absolute Eosinophils  0.4   0.3


 


Absolute Basophils  0.1   0.0


 


Sodium   140.1 


 


Potassium   4.8 


 


Chloride   106 


 


Carbon Dioxide   27 


 


Anion Gap   7 


 


BUN   16 


 


Creatinine   0.78 


 


Est GFR ( Amer)   > 60 


 


Est GFR (Non-Af Amer)   > 60 


 


Glucose   108 


 


Calcium   9.4 


 


Total Bilirubin   2.2 H 


 


AST   68 H 


 


ALT   21 


 


Alkaline Phosphatase   96 


 


Total Protein   7.5 


 


Albumin   4.1 


 


Urine Color   


 


Urine Appearance   


 


Urine pH   


 


Ur Specific Gravity   


 


Urine Protein   


 


Urine Glucose (UA)   


 


Urine Ketones   


 


Urine Blood   


 


Urine Nitrite   


 


Ur Leukocyte Esterase   


 


Urine WBC (Auto)   


 


Urine RBC (Auto)   














  19





  04:38 05:50


 


WBC  


 


RBC  


 


Hgb  


 


Hct  


 


MCV  


 


MCH  


 


MCHC  


 


RDW  


 


Plt Count  


 


Seg Neutrophils %  


 


Lymphocytes %  


 


Monocytes %  


 


Eosinophils %  


 


Basophils %  


 


Absolute Neutrophils  


 


Absolute Lymphocytes  


 


Absolute Monocytes  


 


Absolute Eosinophils  


 


Absolute Basophils  


 


Sodium  139.4 


 


Potassium  3.8  D 


 


Chloride  112 H 


 


Carbon Dioxide  22 


 


Anion Gap  5 


 


BUN  15 


 


Creatinine  0.56 


 


Est GFR ( Amer)  > 60 


 


Est GFR (Non-Af Amer)  > 60 


 


Glucose  64 L 


 


Calcium  8.2 L 


 


Total Bilirubin  1.6 H 


 


AST  42 


 


ALT  29 


 


Alkaline Phosphatase  65 


 


Total Protein  5.3 L 


 


Albumin  2.7 L 


 


Urine Color   JODY


 


Urine Appearance   SLIGHTLY-CLOUDY


 


Urine pH   5.0


 


Ur Specific Gravity   1.028


 


Urine Protein   30 H


 


Urine Glucose (UA)   NEGATIVE


 


Urine Ketones   TRACE H


 


Urine Blood   NEGATIVE


 


Urine Nitrite   NEGATIVE


 


Ur Leukocyte Esterase   NEGATIVE


 


Urine WBC (Auto)   1


 


Urine RBC (Auto)   1








                                        











  19





  16:57 16:57


 


Creatine Kinase  50 L 


 


CK-MB (CK-2)   1.23


 


Troponin I   < 0.012











Impressions: 


                                        





Chest X-Ray  19 06:00


IMPRESSION:  STABLE CHRONIC CHANGES.  STABLE CHEST TUBE.  NO PNEUMOTHORAX.


 














Assessment & Plan





- Diagnosis


(1) Hypertension


Qualifiers: 


   Hypertension type: essential hypertension   Qualified Code(s): I10 - 

Essential (primary) hypertension   


Is this a current diagnosis for this admission?: Yes   


Plan: 


Blood pressure stable at this time








(2) Pneumothorax on right


Is this a current diagnosis for this admission?: Yes   


Plan: 


Reviewed x-ray with radiology this morning's x-ray is significantly improved on 

the initial x-ray right lower lobe atelectasis appears to be resolved at this 

time think no further intervention with bronchoscopy is necessary

## 2019-01-17 NOTE — PDOC PROGRESS REPORT
Subjective


Progress Note for:: 01/17/19


Subjective:: 


Patient seen and examined at bedside.  He is awake alert oriented is not in pain

or distress.  His vital signs and blood works are stable except for his mildly 

elevated liver enzymes due to his cirrhosis of the liver and hepatitis C.





Reason For Visit: 


R SIDE PNEUNO








Physical Exam


Vital Signs: 


                                        











Temp Pulse Resp BP Pulse Ox


 


 98.1 F   68   16   115/56 L  96 


 


 01/17/19 14:01  01/17/19 14:01  01/17/19 14:01  01/17/19 14:01  01/17/19 14:01








                                 Intake & Output











 01/16/19 01/17/19 01/18/19





 06:59 06:59 06:59


 


Intake Total  1236 1000


 


Output Total  350 


 


Balance  886 1000


 


Weight  83.2 kg 











General appearance: PRESENT: no acute distress


Eye exam: PRESENT: conjunctiva pink


Mouth exam: PRESENT: moist


Neck exam: ABSENT: carotid bruit, JVD, lymphadenopathy, thyromegaly


Respiratory exam: PRESENT: decreased breath sounds


Neurological exam: PRESENT: alert, awake, oriented to time, oriented to 

situation





Results


Laboratory Results: 


                                        





                                 01/17/19 04:38 





                                 01/17/19 04:38 





                                        











  01/16/19 01/16/19 01/17/19





  16:57 16:57 04:38


 


WBC  5.0   4.3


 


RBC  4.79   3.96 L


 


Hgb  14.4   12.0 L D


 


Hct  41.2   33.9 L


 


MCV  86   86


 


MCH  30.0   30.4


 


MCHC  34.9   35.5


 


RDW  17.4 H   17.6 H


 


Plt Count  101 L   73 L


 


Seg Neutrophils %  40.4 L   51.9


 


Lymphocytes %  40.6   34.0


 


Monocytes %  9.5   6.9


 


Eosinophils %  8.3 H   6.3 H


 


Basophils %  1.2   0.9


 


Absolute Neutrophils  2.0   2.3


 


Absolute Lymphocytes  2.0   1.5


 


Absolute Monocytes  0.5   0.3


 


Absolute Eosinophils  0.4   0.3


 


Absolute Basophils  0.1   0.0


 


Sodium   140.1 


 


Potassium   4.8 


 


Chloride   106 


 


Carbon Dioxide   27 


 


Anion Gap   7 


 


BUN   16 


 


Creatinine   0.78 


 


Est GFR ( Amer)   > 60 


 


Est GFR (Non-Af Amer)   > 60 


 


Glucose   108 


 


Calcium   9.4 


 


Total Bilirubin   2.2 H 


 


AST   68 H 


 


ALT   21 


 


Alkaline Phosphatase   96 


 


Total Protein   7.5 


 


Albumin   4.1 


 


Urine Color   


 


Urine Appearance   


 


Urine pH   


 


Ur Specific Gravity   


 


Urine Protein   


 


Urine Glucose (UA)   


 


Urine Ketones   


 


Urine Blood   


 


Urine Nitrite   


 


Ur Leukocyte Esterase   


 


Urine WBC (Auto)   


 


Urine RBC (Auto)   














  01/17/19 01/17/19





  04:38 05:50


 


WBC  


 


RBC  


 


Hgb  


 


Hct  


 


MCV  


 


MCH  


 


MCHC  


 


RDW  


 


Plt Count  


 


Seg Neutrophils %  


 


Lymphocytes %  


 


Monocytes %  


 


Eosinophils %  


 


Basophils %  


 


Absolute Neutrophils  


 


Absolute Lymphocytes  


 


Absolute Monocytes  


 


Absolute Eosinophils  


 


Absolute Basophils  


 


Sodium  139.4 


 


Potassium  3.8  D 


 


Chloride  112 H 


 


Carbon Dioxide  22 


 


Anion Gap  5 


 


BUN  15 


 


Creatinine  0.56 


 


Est GFR ( Amer)  > 60 


 


Est GFR (Non-Af Amer)  > 60 


 


Glucose  64 L 


 


Calcium  8.2 L 


 


Total Bilirubin  1.6 H 


 


AST  42 


 


ALT  29 


 


Alkaline Phosphatase  65 


 


Total Protein  5.3 L 


 


Albumin  2.7 L 


 


Urine Color   JODY


 


Urine Appearance   SLIGHTLY-CLOUDY


 


Urine pH   5.0


 


Ur Specific Gravity   1.028


 


Urine Protein   30 H


 


Urine Glucose (UA)   NEGATIVE


 


Urine Ketones   TRACE H


 


Urine Blood   NEGATIVE


 


Urine Nitrite   NEGATIVE


 


Ur Leukocyte Esterase   NEGATIVE


 


Urine WBC (Auto)   1


 


Urine RBC (Auto)   1








                                        











  01/16/19 01/16/19





  16:57 16:57


 


Creatine Kinase  50 L 


 


CK-MB (CK-2)   1.23


 


Troponin I   < 0.012











Impressions: 


                                        





Chest X-Ray  01/17/19 06:00


IMPRESSION:  STABLE CHRONIC CHANGES.  STABLE CHEST TUBE.  NO PNEUMOTHORAX.


 














Assessment & Plan





- Diagnosis


(1) Pneumothorax on right


Is this a current diagnosis for this admission?: Yes   


Plan: 


Acute spontaneous pneumothorax status post chest drainage.








(2) Hypertension


Qualifiers: 


   Hypertension type: essential hypertension   Qualified Code(s): I10 - 

Essential (primary) hypertension   


Is this a current diagnosis for this admission?: Yes   


Plan: 


Continue home medication








(3) Cirrhosis of liver with ascites


Qualifiers: 


   Hepatic cirrhosis type: unspecified hepatic cirrhosis   Qualified Code(s): 

K74.60 - Unspecified cirrhosis of liver; R18.8 - Other ascites; R18.8 - Other 

ascites   


Is this a current diagnosis for this admission?: Yes   


Plan: 


Compensated

## 2019-01-17 NOTE — EKG REPORT
SEVERITY:- BORDERLINE ECG -

SINUS RHYTHM

BORDERLINE PROLONGED QT INTERVAL

:

Confirmed by: Sherrie Bocanegra 17-Jan-2019 09:18:21

## 2019-01-17 NOTE — RADIOLOGY REPORT (SQ)
EXAM DESCRIPTION:  CHEST SINGLE VIEW



COMPLETED DATE/TIME:  1/17/2019 9:46 am



REASON FOR STUDY:  right chest tube



COMPARISON:  1/16/2019 and 12/12/2018.



EXAM PARAMETERS:  NUMBER OF VIEWS: One view.

TECHNIQUE: Single frontal radiographic view of the chest acquired.

RADIATION DOSE: NA

LIMITATIONS: None.



FINDINGS:  LUNGS AND PLEURA: No pneumothorax.  Chronic interstitial changes.  Stable bleb in the medi
al right upper lobe.  Stable pleural thickening in the upper left chest.

MEDIASTINUM AND HILAR STRUCTURES: No masses.  Contour normal.

HEART AND VASCULAR STRUCTURES: Heart normal in size.  Normal vasculature.

BONES: No acute findings.  Old fractures of the left rib and scapula.

HARDWARE: Stable chest tube.

OTHER: No other significant finding.



IMPRESSION:  STABLE CHRONIC CHANGES.  STABLE CHEST TUBE.  NO PNEUMOTHORAX.



TECHNICAL DOCUMENTATION:  JOB ID:  [id number]

 2011 [employer]- All Rights Reserved



Reading location - IP/workstation name: [email or web address]

## 2019-01-18 NOTE — DISCHARGE SUMMARY E
Discharge Summary



NAME: KEKE CHAMBERS III

MRN:  [id number]        : 1952     AGE: 66Y

ADMITTED: 2019                 DISCHARGED: 2019



FINAL DIAGNOSES:

1.  Right pneumothorax.

2.  Hepatitis C virus infection.

3.  Liver cirrhosis.

4.  Status post TIPS procedure.

5.  Chest tube placement on 2019.



COMPLICATIONS:

None.



HOSPITAL COURSE:

This is a 66-year-old male with a history of hepatitis C virus infection

and liver cirrhosis.  He underwent a TIPS procedure at [facility] on

2018.  The procedure was uneventful and the patient was discharged

to home.  The week prior to the presentation in the emergency room the

patient presented with complaints of chest discomfort and difficulty

breathing.  The patient was seen by his gastroenterologist 

for followup after his TIPS, and because of shortness of breath a chest

x-ray was recommended which revealed a large right pneumothorax without

evidence of tension pneumothorax.  The patient was sent to the emergency

room where he underwent uneventful placement of right chest tube with

resolution of the pneumothorax.  However, he presented with collapse of

the middle and lower lobe on the right side.  The patient was admitted for

observation and was kept n.p.o. on IV fluids.  Pulmonary Service,

Dr. Arredondo, was consulted for possible bronchoscopy, and the following

morning the patient underwent a new chest x-ray which revealed complete

resolution of the right middle and lower lobe collapse and no evidence of a

pneumothorax.  The chest tube revealed no air leak in the air/fluid

chamber, and the patient was in stable condition.  The patient was seen by

the pulmonologist who decided not to proceed with a bronchoscopy.  At this

point the patient was discharged home the same day, .  He was

given a follow-up appointment in the [facility] in 2 weeks with the ERICA Edmonds.  Chest Xray to be done on the day of the clinic appointlent.

He was instructed not to strain or lift any heavy weights

more than 10 pounds, no smoking.  He was instructed to maintain the

Heimlich valve in place.  Keep the dressing clean, dry, and intact, sponge

bath only.  Regular diet, resume home medications.



DICTATING PHYSICIAN:  ALVINA SCHULTE M.D.





[id number]                  DT: 2019    1253

PHY#: [id number]            DD: 2019    1249

ID:   [id number]           JOB#: [id number]       ACCT: [id number]



cc:ALVINA SCHULTE M.D.

>







GERI

## 2019-01-30 NOTE — RADIOLOGY REPORT (SQ)
EXAM DESCRIPTION:  CHEST SINGLE VIEW



COMPLETED DATE/TIME:  1/30/2019 12:44 pm



REASON FOR STUDY:  POST REMOVAL OF CHEST TUBE



COMPARISON:  None.



EXAM PARAMETERS:  NUMBER OF VIEWS: One view.

TECHNIQUE: Single frontal radiographic view of the chest acquired.

RADIATION DOSE: NA

LIMITATIONS: None.



FINDINGS:  LUNGS AND PLEURA: No pneumo thorax.

MEDIASTINUM AND HILAR STRUCTURES: No masses.  Contour normal.

HEART AND VASCULAR STRUCTURES: Heart normal in size.  Normal vasculature.

BONES: Old traumatic changes in the left upper chest.

HARDWARE: Small chest tube has been removed.

OTHER: No other significant finding.



IMPRESSION:  No pneumothorax status post removal of chest tube.



TECHNICAL DOCUMENTATION:  JOB ID:  [id number]

 2011 [employer]- All Rights Reserved



Reading location - IP/workstation name: [email or web address]

## 2019-01-30 NOTE — RADIOLOGY REPORT (SQ)
EXAM DESCRIPTION:  CHEST SINGLE VIEW



COMPLETED DATE/TIME:  1/30/2019 12:36 pm



REASON FOR STUDY:  PNEUMOTHORAX, UNSPECIFIED



COMPARISON:  1/17/2019



EXAM PARAMETERS:  NUMBER OF VIEWS: One view.

TECHNIQUE: Single frontal radiographic view of the chest acquired.

RADIATION DOSE: NA

LIMITATIONS: None.



FINDINGS:  LUNGS AND PLEURA: No pneumothorax.  No infiltrate.  Calcified granuloma in the left lung.

MEDIASTINUM AND HILAR STRUCTURES: No masses.  Contour normal.

HEART AND VASCULAR STRUCTURES: Heart normal in size.  Normal vasculature.

BONES: Apparent posttraumatic changes in the left upper thorax.

HARDWARE: Small thoracotomy catheter on the right.

OTHER: No other significant finding.



IMPRESSION:  No residual pneumothorax.



TECHNICAL DOCUMENTATION:  JOB ID:  [id number]

 2011 [employer]- All Rights Reserved



Reading location - IP/workstation name: [email or web address]

## 2019-06-28 NOTE — RADIOLOGY REPORT (SQ)
EXAM DESCRIPTION:  MRI ABDOMEN COMBO



COMPLETED DATE/TIME:  6/28/2019 1:49 pm



REASON FOR STUDY:  K74.69 OTHER CIRRHOSIS OF LIVER K74.69  OTHER CIRRHOSIS OF LIVER K76.6  PORTAL HYP
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



COMPARISON:  5/21/2018.



TECHNIQUE:  Multiplanar multisequence imaging performed without and with contrast including sagittal,
 axial and coronal T2, axial T1, axial gradient fat sat T1, axial, sagittal and coronal fat sat T1 po
st contrast.



CONTRAST TYPE AND DOSE:  20 mL Dotarem.



RENAL FUNCTION:  Not indicated.  ACR Type II contrast agent associated with few, if any, unconfounded
 cases of NSF



LIMITATIONS:  None.



FINDINGS:  LIVER: Normal size. No masses.  No dilated ducts. CBD normal.  A TIPS shunt is present.

SPLEEN: Mild splenomegaly. No focal lesions.

PANCREAS: No masses. No adjacent inflammation or peripancreatic fluid collections. Pancreatic duct no
t dilated.

GALLBLADDER: No masses. No stones. No gallbladder wall thickening or pericholecystic fluid.

ADRENAL GLANDS: No significant masses or asymmetry.

RIGHT KIDNEY AND URETER: No masses. No hydronephrosis.

LEFT KIDNEY AND URETER: No masses.  Chronic hydronephrosis.

AORTA AND VESSELS: No aneurysm. No dissection. Renal arteries, SMA, celiac without stenosis.

RETROPERITONEUM: No retroperitoneal adenopathy, hemorrhage or masses.

BOWEL: No visualized masses.  No inflammation.  No significant dilatation.

ABDOMINAL WALL AND PERITONEUM: Umbilical hernia containing bowel, unchanged.  No free fluid.

BONES: No acute or significant findings.

OTHER: No other significant finding.



IMPRESSION:

1. TIPS SHUNT PRESENT IN THE LIVER.  NO FOCAL HEPATIC LESIONS OR OTHER FINDINGS IN THE LIVER.

2. MILD SPLENOMEGALY.

3. CHRONIC HYDRONEPHROSIS OF THE LEFT KIDNEY.

4. UMBILICAL HERNIA CONTAINING BOWEL, UNCHANGED FROM PRIOR STUDY.



TECHNICAL DOCUMENTATION:  JOB ID: [id number]

 2011 [employer]- All Rights Reserved



Reading location - IP/workstation name: [email or web address]

## 2020-12-07 NOTE — RADIOLOGY REPORT (SQ)
EXAM DESCRIPTION:  U/S BIOPSY LIVER



IMAGES COMPLETED DATE/TIME:  12/7/2020 2:24 pm



REASON FOR STUDY:  LIVER MASS



COMPARISON  None.



LIMITATIONS:  None.



PROCEDURE:  The procedure, risks, benefits, and alternatives were discussed with the patient in the p
reprocedural area, and all questions were answered. Informed consent was obtained verbally and in wri
ting.

The patient was then brought to the ultrasound suite, positioned supine on a gurney, and a time-out w
as performed.  After that, selected grayscale and color Doppler images of the hypoechoic mass in the 
right hepatic lobe were obtained. Based on review of the images an appropriate access site was select
ed on the skin.

The area around the selected access site was then prepped and draped with 2% chlorhexidine utilizing 
standard sterile technique.  After that, the access site was infiltrated with 1% lidocaine and a skin
 incision was made with a #11 blade. A 17 gauge coaxial needle was then advanced through the skin inc
ision and into the mass utilizing sonographic guidance. After that, the inner stylet of the coaxial n
eedle was removed and 4 18 gauge core samples were obtained of the mass - the samples were collected 
and submitted to cytopathology in formalin.

The coaxial needle was then removed and selected grayscale and color Doppler images of the mass were 
repeated and reviewed ; the images demonstrated no acute biopsy-related complication.

The patient tolerated the procedure well with local anesthesia.

At the end of the procedure the patient's condition was unchanged from the preprocedural baseline.

IV conscious sedation was administered at the direction of the performing physician by a pancho victor. 1 milligrams of Versed and 100 micrograms of fentanyl. Physiologic monitoring was provided befo
re, during, and after sedation. The total sedation time was 45 minutes.

At the end of the procedure the patient's condition was unchanged from the preprocedural baseline.

Documentation of face-to-face time the performing proceduralist spent monitoring the patient: 30 radha
elizabeth.



IMPRESSION:  Successful ultrasound-guided biopsy of the hypoechoic mass in the right hepatic lobe.



COMMENT:  Patient medication list reviewed: Yes- Quality ID# [id number]:Eligible professional attests to doc
umenting in the medical record they obtained, updated, or reviewed the patient's current medications.




TECHNICAL DOCUMENTATION:  JOB ID:  [id number]

 2011 [employer]- All Rights Reserved



Reading location - IP/workstation name: [email or web address]